# Patient Record
Sex: FEMALE | URBAN - METROPOLITAN AREA
[De-identification: names, ages, dates, MRNs, and addresses within clinical notes are randomized per-mention and may not be internally consistent; named-entity substitution may affect disease eponyms.]

---

## 2024-09-26 ENCOUNTER — HOSPITAL ENCOUNTER (OUTPATIENT)
Facility: HOSPITAL | Age: 74
End: 2024-09-26
Payer: MEDICAID

## 2024-09-28 ENCOUNTER — HOSPITAL ENCOUNTER (OUTPATIENT)
Dept: RADIOLOGY | Facility: HOSPITAL | Age: 74
Discharge: HOME | End: 2024-09-28
Payer: MEDICAID

## 2024-09-28 LAB
ALBUMIN SERPL BCP-MCNC: 2.3 G/DL (ref 3.4–5)
ALP SERPL-CCNC: 119 U/L (ref 33–136)
ALT SERPL W P-5'-P-CCNC: 108 U/L (ref 7–45)
ANION GAP SERPL CALC-SCNC: 14 MMOL/L (ref 10–20)
AST SERPL W P-5'-P-CCNC: 65 U/L (ref 9–39)
BILIRUB SERPL-MCNC: 0.4 MG/DL (ref 0–1.2)
BUN SERPL-MCNC: 82 MG/DL (ref 6–23)
CALCIUM SERPL-MCNC: 8.5 MG/DL (ref 8.6–10.3)
CHLORIDE SERPL-SCNC: 111 MMOL/L (ref 98–107)
CO2 SERPL-SCNC: 31 MMOL/L (ref 21–32)
CREAT SERPL-MCNC: 1.28 MG/DL (ref 0.5–1.05)
EGFRCR SERPLBLD CKD-EPI 2021: 45 ML/MIN/1.73M*2
ERYTHROCYTE [DISTWIDTH] IN BLOOD BY AUTOMATED COUNT: 18.6 % (ref 11.5–14.5)
GLUCOSE SERPL-MCNC: 232 MG/DL (ref 74–99)
HCT VFR BLD AUTO: 26.1 % (ref 36–46)
HGB BLD-MCNC: 8.1 G/DL (ref 12–16)
MCH RBC QN AUTO: 29.2 PG (ref 26–34)
MCHC RBC AUTO-ENTMCNC: 31 G/DL (ref 32–36)
MCV RBC AUTO: 94 FL (ref 80–100)
NRBC BLD-RTO: 0.4 /100 WBCS (ref 0–0)
PLATELET # BLD AUTO: 432 X10*3/UL (ref 150–450)
POTASSIUM SERPL-SCNC: 3.1 MMOL/L (ref 3.5–5.3)
PROT SERPL-MCNC: 5.8 G/DL (ref 6.4–8.2)
RBC # BLD AUTO: 2.77 X10*6/UL (ref 4–5.2)
SODIUM SERPL-SCNC: 153 MMOL/L (ref 136–145)
WBC # BLD AUTO: 15.5 X10*3/UL (ref 4.4–11.3)

## 2024-09-28 PROCEDURE — 71045 X-RAY EXAM CHEST 1 VIEW: CPT | Performed by: RADIOLOGY

## 2024-09-28 PROCEDURE — 85027 COMPLETE CBC AUTOMATED: CPT | Performed by: INTERNAL MEDICINE

## 2024-09-28 PROCEDURE — 80053 COMPREHEN METABOLIC PANEL: CPT | Performed by: INTERNAL MEDICINE

## 2024-09-28 PROCEDURE — 71045 X-RAY EXAM CHEST 1 VIEW: CPT

## 2024-09-28 PROCEDURE — 36415 COLL VENOUS BLD VENIPUNCTURE: CPT | Performed by: INTERNAL MEDICINE

## 2024-09-29 LAB
ALBUMIN SERPL BCP-MCNC: 2.4 G/DL (ref 3.4–5)
ALP SERPL-CCNC: 109 U/L (ref 33–136)
ALT SERPL W P-5'-P-CCNC: 87 U/L (ref 7–45)
ANION GAP SERPL CALC-SCNC: 15 MMOL/L (ref 10–20)
AST SERPL W P-5'-P-CCNC: 51 U/L (ref 9–39)
BILIRUB SERPL-MCNC: 0.3 MG/DL (ref 0–1.2)
BUN SERPL-MCNC: 86 MG/DL (ref 6–23)
CALCIUM SERPL-MCNC: 8.6 MG/DL (ref 8.6–10.3)
CHLORIDE SERPL-SCNC: 110 MMOL/L (ref 98–107)
CHLORIDE UR-SCNC: 106 MMOL/L
CHLORIDE/CREATININE (MMOL/G) IN URINE: 835 MMOL/G CREAT (ref 38–318)
CHOLEST SERPL-MCNC: 145 MG/DL (ref 0–199)
CHOLESTEROL/HDL RATIO: 4.1
CO2 SERPL-SCNC: 31 MMOL/L (ref 21–32)
CREAT SERPL-MCNC: 1.21 MG/DL (ref 0.5–1.05)
CREAT UR-MCNC: 12.7 MG/DL (ref 20–320)
EGFRCR SERPLBLD CKD-EPI 2021: 48 ML/MIN/1.73M*2
ERYTHROCYTE [DISTWIDTH] IN BLOOD BY AUTOMATED COUNT: 18.7 % (ref 11.5–14.5)
GLUCOSE SERPL-MCNC: 147 MG/DL (ref 74–99)
HCT VFR BLD AUTO: 27.4 % (ref 36–46)
HDLC SERPL-MCNC: 35.3 MG/DL
HGB BLD-MCNC: 8 G/DL (ref 12–16)
IRON SATN MFR SERPL: 21 % (ref 25–45)
IRON SERPL-MCNC: 41 UG/DL (ref 35–150)
LDLC SERPL CALC-MCNC: 70 MG/DL
MCH RBC QN AUTO: 28 PG (ref 26–34)
MCHC RBC AUTO-ENTMCNC: 29.2 G/DL (ref 32–36)
MCV RBC AUTO: 96 FL (ref 80–100)
MICROALBUMIN UR-MCNC: 7.5 MG/L
MICROALBUMIN/CREAT UR: 59.1 UG/MG CREAT
MYOGLOBIN SERPL-MCNC: 88 UG/L
NON HDL CHOLESTEROL: 110 MG/DL (ref 0–149)
NRBC BLD-RTO: 0.6 /100 WBCS (ref 0–0)
PLATELET # BLD AUTO: 468 X10*3/UL (ref 150–450)
POTASSIUM SERPL-SCNC: 2.6 MMOL/L (ref 3.5–5.3)
POTASSIUM UR-SCNC: 22 MMOL/L
POTASSIUM/CREAT UR-RTO: 173 MMOL/G CREAT
PROT SERPL-MCNC: 6.3 G/DL (ref 6.4–8.2)
RBC # BLD AUTO: 2.86 X10*6/UL (ref 4–5.2)
SODIUM SERPL-SCNC: 153 MMOL/L (ref 136–145)
SODIUM UR-SCNC: 98 MMOL/L
SODIUM/CREAT UR-RTO: 772 MMOL/G CREAT
TIBC SERPL-MCNC: 195 UG/DL (ref 240–445)
TRIGL SERPL-MCNC: 200 MG/DL (ref 0–149)
UIBC SERPL-MCNC: 154 UG/DL (ref 110–370)
URATE SERPL-MCNC: 12.4 MG/DL (ref 2.3–6.7)
UREA/CREAT UR-SRTO: 27.1 G/G CREAT
UUN UR-MCNC: 344 MG/DL
VLDL: 40 MG/DL (ref 0–40)
WBC # BLD AUTO: 17.2 X10*3/UL (ref 4.4–11.3)

## 2024-09-29 PROCEDURE — 80053 COMPREHEN METABOLIC PANEL: CPT | Performed by: INTERNAL MEDICINE

## 2024-09-29 PROCEDURE — 80061 LIPID PANEL: CPT | Performed by: INTERNAL MEDICINE

## 2024-09-29 PROCEDURE — 84550 ASSAY OF BLOOD/URIC ACID: CPT | Performed by: INTERNAL MEDICINE

## 2024-09-29 PROCEDURE — 85027 COMPLETE CBC AUTOMATED: CPT | Performed by: INTERNAL MEDICINE

## 2024-09-29 PROCEDURE — 83550 IRON BINDING TEST: CPT | Performed by: INTERNAL MEDICINE

## 2024-09-29 PROCEDURE — 36415 COLL VENOUS BLD VENIPUNCTURE: CPT | Performed by: INTERNAL MEDICINE

## 2024-09-29 PROCEDURE — 83874 ASSAY OF MYOGLOBIN: CPT | Mod: PARLAB | Performed by: INTERNAL MEDICINE

## 2024-09-29 PROCEDURE — 83540 ASSAY OF IRON: CPT | Performed by: INTERNAL MEDICINE

## 2024-09-29 PROCEDURE — 82043 UR ALBUMIN QUANTITATIVE: CPT | Performed by: INTERNAL MEDICINE

## 2024-09-29 PROCEDURE — 82436 ASSAY OF URINE CHLORIDE: CPT | Performed by: INTERNAL MEDICINE

## 2024-09-29 PROCEDURE — 84540 ASSAY OF URINE/UREA-N: CPT | Performed by: INTERNAL MEDICINE

## 2024-09-29 PROCEDURE — 87040 BLOOD CULTURE FOR BACTERIA: CPT | Mod: PARLAB | Performed by: INTERNAL MEDICINE

## 2024-09-29 NOTE — CONSULTS
Reason For Consult  NATHALIE/Electrolyte Imbalance    History Of Present Illness  73 year old Female Admitted to the Hospital originally with Acute Cholecystitis..  Had Laparotomy with Subtotal fenestrated Cholecystectomy.  Developed Mental status Changes;; Increase drainage From Ostomy Bag : Tachycardia and Leukocytosis; Had Return to OR where a Large Hematoma was  evacuated; She required Intubation And Mechanical Ventilation.  Developed NATHALIE requiring temporary dialysis ;  Renal Chemistries improved with Dialysis Discontinuation and removal Of Tunneled dialysis catheter.  Patient Developed Positive Cultures for Candida Albicans and Klebsiella in the Sputum For which She Received IV antibiotics.  Patient also developed Hemorrhagic Shock with Hemolytic Anemia with +CATHLEEN;; LDH elevation;Decrease Haptoglobin  Was Given Solu Medrol 1 mg/kg  for AHA(Autoimmune Hemolyic Anemia.  Patient also has Hx Of Chronic Hepatitis C      Renal Service is consulted for evaluation of NATHALIE/Electrolyte Disturbances  Past Medical History  NATHALIE  AHA(Autoimmune Hemolytic Anemia)  Malnutrition  Sepsis  Hemorrhagic Shock  Diabetes 2  Chronic Hepatitis C  Acute Cholecystitis  Anemia  Respiratory failure  Hypokalemia  Hypernatremia  Hypertension  Septic shock    Surgical History  Cholecystectomy  Hematoma Drainage     Social History  She has no history on file for tobacco use, alcohol use, and drug use.    Family History  No family history on file.     Allergies  Patient has no allergy information on record.    Review of Systems  10 point review of system negative except as mentioned in HPI     Physical Exam  General Appearance; obtunded  HEENT; pupils react to light accommodation  Neck; no JVD no bruit no thyromegaly  Lungs; bibasilar crackles on inspiration  Heart; no gallop no rubs  Abdomen; active peristalsis  ; no CVA tenderness  Extremities; 2-3+ edema  Neurological; no clonus or asterixis  Musculoskeletal; decreased muscle tone        Relevant  Results  Results for orders placed or performed during the hospital encounter of 09/26/24 (from the past 24 hour(s))   CBC   Result Value Ref Range    WBC 17.2 (H) 4.4 - 11.3 x10*3/uL    nRBC 0.6 (H) 0.0 - 0.0 /100 WBCs    RBC 2.86 (L) 4.00 - 5.20 x10*6/uL    Hemoglobin 8.0 (L) 12.0 - 16.0 g/dL    Hematocrit 27.4 (L) 36.0 - 46.0 %    MCV 96 80 - 100 fL    MCH 28.0 26.0 - 34.0 pg    MCHC 29.2 (L) 32.0 - 36.0 g/dL    RDW 18.7 (H) 11.5 - 14.5 %    Platelets 468 (H) 150 - 450 x10*3/uL   Comprehensive metabolic panel   Result Value Ref Range    Glucose 147 (H) 74 - 99 mg/dL    Sodium 153 (H) 136 - 145 mmol/L    Potassium 2.6 (LL) 3.5 - 5.3 mmol/L    Chloride 110 (H) 98 - 107 mmol/L    Bicarbonate 31 21 - 32 mmol/L    Anion Gap 15 10 - 20 mmol/L    Urea Nitrogen 86 (H) 6 - 23 mg/dL    Creatinine 1.21 (H) 0.50 - 1.05 mg/dL    eGFR 48 (L) >60 mL/min/1.73m*2    Calcium 8.6 8.6 - 10.3 mg/dL    Albumin 2.4 (L) 3.4 - 5.0 g/dL    Alkaline Phosphatase 109 33 - 136 U/L    Total Protein 6.3 (L) 6.4 - 8.2 g/dL    AST 51 (H) 9 - 39 U/L    Bilirubin, Total 0.3 0.0 - 1.2 mg/dL    ALT 87 (H) 7 - 45 U/L   Iron and TIBC   Result Value Ref Range    Iron 41 35 - 150 ug/dL    UIBC 154 110 - 370 ug/dL    TIBC 195 (L) 240 - 445 ug/dL    % Saturation 21 (L) 25 - 45 %   Lipid panel   Result Value Ref Range    Cholesterol 145 0 - 199 mg/dL    HDL-Cholesterol 35.3 mg/dL    Cholesterol/HDL Ratio 4.1     LDL Calculated 70 <=99 mg/dL    VLDL 40 0 - 40 mg/dL    Triglycerides 200 (H) 0 - 149 mg/dL    Non HDL Cholesterol 110 0 - 149 mg/dL   Uric Acid   Result Value Ref Range    Uric Acid 12.4 (H) 2.3 - 6.7 mg/dL   Urea Nitrogen, Urine Random   Result Value Ref Range    Urea Nitrogen, Urine Random 344 mg/dL    Creatinine, Urine Random 12.7 (L) 20.0 - 320.0 mg/dL    Urea Nitrogen/Creatinine Ratio 27.1 Not established. g/g creat   Albumin-Creatinine Ratio, Urine Random   Result Value Ref Range    Albumin, Urine Random 7.5 Not established mg/L     Creatinine, Urine Random 12.7 (L) 20.0 - 320.0 mg/dL    Albumin/Creatinine Ratio 59.1 ug/mg Creat   Urine electrolytes   Result Value Ref Range    Sodium, Urine Random 98 mmol/L    Sodium/Creatinine Ratio 772 Not established. mmol/g Creat    Potassium, Urine Random 22 mmol/L    Potassium/Creatinine Ratio 173 Not established mmol/g Creat    Chloride, Urine Random 106 mmol/L    Chloride/Creatinine Ratio 835 (H) 38 - 318 mmol/g creat    Creatinine, Urine Random 12.7 (L) 20.0 - 320.0 mg/dL    XR chest 1 view    Result Date: 9/28/2024  Interpreted By:  Isai Reilly, STUDY: XR CHEST 1 VIEW;  9/28/2024 12:52 pm   INDICATION: Signs/Symptoms:pulmonary edema.     COMPARISON: None.   ACCESSION NUMBER(S): [id number]   ORDERING CLINICIAN: RAIMUNDO TARIQ   FINDINGS: Portable AP upright chest x-ray 09/28/2024:   Feeding type NG tube entering the abdomen, the tip of which is not included.   CARDIOMEDIASTINAL SILHOUETTE: Cardiomediastinal silhouette is normal in size and configuration.   LUNGS: Appear clear aside from bilateral mid to lower linear and bandlike densities suggesting subsegmental atelectasis or scars, which can not be distinguished without old or follow-up films. Mild bilateral lateral costophrenic angle blunting could be related to mild-to-moderate pulmonary underinflation, but tiny pleural effusions can not be ruled out without lateral or decubitus views.   ABDOMEN: No remarkable upper abdominal findings.   BONES: No acute osseous changes.       1. Underinflated lungs with subsegmental atelectasis or scarring.   MACRO: None   Signed by: Isai Reilly 9/28/2024 1:46 PM Dictation workstation:   [id number]       Assessment/Plan   Acute kidney injury  Hyponatremia  Hypokalemia  Autoimmune hemolytic anemia  Agree with iron deficiency  Hyperchloremia  Metabolic alkalosis  Anasarca  Sarcopenia  Chronic hepatitis C  Plan  Urinary indices  Iron and nutritional indicis  Replace potassium    I spent  60  minutes in the professional and overall care of this patient.      Ha Balderas MD

## 2024-09-30 LAB
ALBUMIN SERPL BCP-MCNC: 2.6 G/DL (ref 3.4–5)
ANION GAP SERPL CALC-SCNC: 15 MMOL/L (ref 10–20)
BASOPHILS # BLD AUTO: 0.06 X10*3/UL (ref 0–0.1)
BASOPHILS NFR BLD AUTO: 0.3 %
BUN SERPL-MCNC: 78 MG/DL (ref 6–23)
CALCIUM SERPL-MCNC: 9.1 MG/DL (ref 8.6–10.3)
CHLORIDE SERPL-SCNC: 111 MMOL/L (ref 98–107)
CO2 SERPL-SCNC: 31 MMOL/L (ref 21–32)
CREAT SERPL-MCNC: 1.08 MG/DL (ref 0.5–1.05)
EGFRCR SERPLBLD CKD-EPI 2021: 55 ML/MIN/1.73M*2
EOSINOPHIL # BLD AUTO: 0.41 X10*3/UL (ref 0–0.4)
EOSINOPHIL NFR BLD AUTO: 2.2 %
ERYTHROCYTE [DISTWIDTH] IN BLOOD BY AUTOMATED COUNT: 19.1 % (ref 11.5–14.5)
GLUCOSE SERPL-MCNC: 173 MG/DL (ref 74–99)
HCT VFR BLD AUTO: 28 % (ref 36–46)
HGB BLD-MCNC: 8.3 G/DL (ref 12–16)
IMM GRANULOCYTES # BLD AUTO: 0.15 X10*3/UL (ref 0–0.5)
IMM GRANULOCYTES NFR BLD AUTO: 0.8 % (ref 0–0.9)
LYMPHOCYTES # BLD AUTO: 2.48 X10*3/UL (ref 0.8–3)
LYMPHOCYTES NFR BLD AUTO: 13.2 %
MAGNESIUM SERPL-MCNC: 1.56 MG/DL (ref 1.6–2.4)
MCH RBC QN AUTO: 28.4 PG (ref 26–34)
MCHC RBC AUTO-ENTMCNC: 29.6 G/DL (ref 32–36)
MCV RBC AUTO: 96 FL (ref 80–100)
MONOCYTES # BLD AUTO: 0.65 X10*3/UL (ref 0.05–0.8)
MONOCYTES NFR BLD AUTO: 3.5 %
NEUTROPHILS # BLD AUTO: 15.01 X10*3/UL (ref 1.6–5.5)
NEUTROPHILS NFR BLD AUTO: 80 %
NRBC BLD-RTO: 0.4 /100 WBCS (ref 0–0)
PHOSPHATE SERPL-MCNC: 3.2 MG/DL (ref 2.5–4.9)
PLATELET # BLD AUTO: 496 X10*3/UL (ref 150–450)
POTASSIUM SERPL-SCNC: 2.8 MMOL/L (ref 3.5–5.3)
RBC # BLD AUTO: 2.92 X10*6/UL (ref 4–5.2)
SODIUM SERPL-SCNC: 154 MMOL/L (ref 136–145)
WBC # BLD AUTO: 18.8 X10*3/UL (ref 4.4–11.3)

## 2024-09-30 PROCEDURE — 83735 ASSAY OF MAGNESIUM: CPT | Performed by: INTERNAL MEDICINE

## 2024-09-30 PROCEDURE — 80069 RENAL FUNCTION PANEL: CPT | Performed by: INTERNAL MEDICINE

## 2024-09-30 PROCEDURE — 36415 COLL VENOUS BLD VENIPUNCTURE: CPT | Performed by: INTERNAL MEDICINE

## 2024-09-30 PROCEDURE — 85025 COMPLETE CBC W/AUTO DIFF WBC: CPT | Performed by: INTERNAL MEDICINE

## 2024-09-30 NOTE — PROGRESS NOTES
Subjective   Patient had labs drawn disclose transaminitis elevation of the AST and ALT 51  And 87 respectively   Iron saturations are low at 21%  Serum iron level is stable at 41  TIBC is 195  Cholesterol is 145  Uric acid is elevated at 12.4  Myoglobin is normal  Urine electrolytes as follows  Urine albumin creatinine ratio mildly elevated at 59.1  Urinary sodium is 98  Urinary potassium is 22   urinary chloride is 106  Urine urea is 344  Urine Creatinine is 12.7  Fractional secretion of sodium is 6.1%  Fractional secretion of urea is 38.  11%  Today labs disclose a serum potassium of 2.8, serum sodium 154 BUN/creatinine 78 over 1.08%    Objective     Physical Exam    General Appearance; obtunded  HEENT; pupils react to light accommodation  Neck; no JVD no bruit no thyromegaly  Lungs; bibasilar crackles on inspiration  Heart; no gallop no rubs  Abdomen; active peristalsis  ; no CVA tenderness  Extremities; 2-3+ edema  Neurological; no clonus or asterixis  Musculoskeletal; decreased muscle tone       Relevant Results       Results for orders placed or performed during the hospital encounter of 09/26/24 (from the past 24 hour(s))   Renal function panel   Result Value Ref Range    Glucose 173 (H) 74 - 99 mg/dL    Sodium 154 (H) 136 - 145 mmol/L    Potassium 2.8 (LL) 3.5 - 5.3 mmol/L    Chloride 111 (H) 98 - 107 mmol/L    Bicarbonate 31 21 - 32 mmol/L    Anion Gap 15 10 - 20 mmol/L    Urea Nitrogen 78 (H) 6 - 23 mg/dL    Creatinine 1.08 (H) 0.50 - 1.05 mg/dL    eGFR 55 (L) >60 mL/min/1.73m*2    Calcium 9.1 8.6 - 10.3 mg/dL    Phosphorus 3.2 2.5 - 4.9 mg/dL    Albumin 2.6 (L) 3.4 - 5.0 g/dL   Magnesium   Result Value Ref Range    Magnesium 1.56 (L) 1.60 - 2.40 mg/dL   CBC and Auto Differential   Result Value Ref Range    WBC 18.8 (H) 4.4 - 11.3 x10*3/uL    nRBC 0.4 (H) 0.0 - 0.0 /100 WBCs    RBC 2.92 (L) 4.00 - 5.20 x10*6/uL    Hemoglobin 8.3 (L) 12.0 - 16.0 g/dL    Hematocrit 28.0 (L) 36.0 - 46.0 %    MCV 96 80 -  100 fL    MCH 28.4 26.0 - 34.0 pg    MCHC 29.6 (L) 32.0 - 36.0 g/dL    RDW 19.1 (H) 11.5 - 14.5 %    Platelets 496 (H) 150 - 450 x10*3/uL    Neutrophils % 80.0 40.0 - 80.0 %    Immature Granulocytes %, Automated 0.8 0.0 - 0.9 %    Lymphocytes % 13.2 13.0 - 44.0 %    Monocytes % 3.5 2.0 - 10.0 %    Eosinophils % 2.2 0.0 - 6.0 %    Basophils % 0.3 0.0 - 2.0 %    Neutrophils Absolute 15.01 (H) 1.60 - 5.50 x10*3/uL    Immature Granulocytes Absolute, Automated 0.15 0.00 - 0.50 x10*3/uL    Lymphocytes Absolute 2.48 0.80 - 3.00 x10*3/uL    Monocytes Absolute 0.65 0.05 - 0.80 x10*3/uL    Eosinophils Absolute 0.41 (H) 0.00 - 0.40 x10*3/uL    Basophils Absolute 0.06 0.00 - 0.10 x10*3/uL       XR chest 1 view    Result Date: 9/28/2024  Interpreted By:  Isai Reilly, STUDY: XR CHEST 1 VIEW;  9/28/2024 12:52 pm   INDICATION: Signs/Symptoms:pulmonary edema.     COMPARISON: None.   ACCESSION NUMBER(S): [id number]   ORDERING CLINICIAN: RAIMUNDO TARIQ   FINDINGS: Portable AP upright chest x-ray 09/28/2024:   Feeding type NG tube entering the abdomen, the tip of which is not included.   CARDIOMEDIASTINAL SILHOUETTE: Cardiomediastinal silhouette is normal in size and configuration.   LUNGS: Appear clear aside from bilateral mid to lower linear and bandlike densities suggesting subsegmental atelectasis or scars, which can not be distinguished without old or follow-up films. Mild bilateral lateral costophrenic angle blunting could be related to mild-to-moderate pulmonary underinflation, but tiny pleural effusions can not be ruled out without lateral or decubitus views.   ABDOMEN: No remarkable upper abdominal findings.   BONES: No acute osseous changes.       1. Underinflated lungs with subsegmental atelectasis or scarring.   MACRO: None   Signed by: Isai Reilly 9/28/2024 1:46 PM Dictation workstation:   [id number]        Assessment/Plan   Acute kidney  injury  Hypernatremia  Hypokalemia  Hyperuricemia  Proteinuria  Transaminitis  Autoimmune hemolytic anemia  Protein calorie malnutrition  Anemia iron deficiency and chronic illness  Plan; correct electrolyte imbalance  D5W for water deficit correction  Monitor renal chemistries  Avoid nephrotoxins or hepatotoxins  Assessment & Plan           I spent  20 minutes in the professional and overall care of this patient.      Ha Balderas MD

## 2024-10-01 LAB
ALBUMIN SERPL BCP-MCNC: 2.5 G/DL (ref 3.4–5)
ALP SERPL-CCNC: 95 U/L (ref 33–136)
ALT SERPL W P-5'-P-CCNC: 58 U/L (ref 7–45)
ANION GAP SERPL CALC-SCNC: 16 MMOL/L (ref 10–20)
AST SERPL W P-5'-P-CCNC: 38 U/L (ref 9–39)
BASOPHILS # BLD AUTO: 0.03 X10*3/UL (ref 0–0.1)
BASOPHILS NFR BLD AUTO: 0.2 %
BILIRUB SERPL-MCNC: 0.4 MG/DL (ref 0–1.2)
BUN SERPL-MCNC: 74 MG/DL (ref 6–23)
CALCIUM SERPL-MCNC: 9.1 MG/DL (ref 8.6–10.3)
CHLORIDE SERPL-SCNC: 114 MMOL/L (ref 98–107)
CO2 SERPL-SCNC: 29 MMOL/L (ref 21–32)
CREAT SERPL-MCNC: 1.06 MG/DL (ref 0.5–1.05)
EGFRCR SERPLBLD CKD-EPI 2021: 56 ML/MIN/1.73M*2
EOSINOPHIL # BLD AUTO: 0.35 X10*3/UL (ref 0–0.4)
EOSINOPHIL NFR BLD AUTO: 2.2 %
ERYTHROCYTE [DISTWIDTH] IN BLOOD BY AUTOMATED COUNT: 19.2 % (ref 11.5–14.5)
GLUCOSE SERPL-MCNC: 190 MG/DL (ref 74–99)
HCT VFR BLD AUTO: 26.9 % (ref 36–46)
HGB BLD-MCNC: 7.8 G/DL (ref 12–16)
IMM GRANULOCYTES # BLD AUTO: 0.1 X10*3/UL (ref 0–0.5)
IMM GRANULOCYTES NFR BLD AUTO: 0.6 % (ref 0–0.9)
LYMPHOCYTES # BLD AUTO: 2.4 X10*3/UL (ref 0.8–3)
LYMPHOCYTES NFR BLD AUTO: 14.9 %
MAGNESIUM SERPL-MCNC: 2.1 MG/DL (ref 1.6–2.4)
MCH RBC QN AUTO: 28.3 PG (ref 26–34)
MCHC RBC AUTO-ENTMCNC: 29 G/DL (ref 32–36)
MCV RBC AUTO: 98 FL (ref 80–100)
MONOCYTES # BLD AUTO: 0.72 X10*3/UL (ref 0.05–0.8)
MONOCYTES NFR BLD AUTO: 4.5 %
NEUTROPHILS # BLD AUTO: 12.5 X10*3/UL (ref 1.6–5.5)
NEUTROPHILS NFR BLD AUTO: 77.6 %
NRBC BLD-RTO: 0.5 /100 WBCS (ref 0–0)
PLATELET # BLD AUTO: 450 X10*3/UL (ref 150–450)
POTASSIUM SERPL-SCNC: 3 MMOL/L (ref 3.5–5.3)
PROT SERPL-MCNC: 6.5 G/DL (ref 6.4–8.2)
RBC # BLD AUTO: 2.76 X10*6/UL (ref 4–5.2)
SODIUM SERPL-SCNC: 156 MMOL/L (ref 136–145)
WBC # BLD AUTO: 16.1 X10*3/UL (ref 4.4–11.3)

## 2024-10-01 PROCEDURE — 83735 ASSAY OF MAGNESIUM: CPT | Performed by: INTERNAL MEDICINE

## 2024-10-01 PROCEDURE — 84075 ASSAY ALKALINE PHOSPHATASE: CPT | Performed by: INTERNAL MEDICINE

## 2024-10-01 PROCEDURE — 85025 COMPLETE CBC W/AUTO DIFF WBC: CPT | Performed by: INTERNAL MEDICINE

## 2024-10-01 PROCEDURE — 36415 COLL VENOUS BLD VENIPUNCTURE: CPT | Performed by: INTERNAL MEDICINE

## 2024-10-01 NOTE — PROGRESS NOTES
Subjective   No new complaint  Labs drawn today disclose again hypernatremia 156  Potassium still low at 3  Hyperchloremia at 114  Creatinine's are stable BUN is 74 creatinine is 1.06  Hemoglobin is 7.8  WBC count is 16,100          Objective     Physical Exam    General Appearance; obtunded  HEENT; pupils react to light accommodation  Neck; no JVD no bruit no thyromegaly  Lungs; bibasilar crackles on inspiration  Heart; no gallop no rubs  Abdomen; active peristalsis  ; no CVA tenderness  Extremities; 2-3+ edema  Neurological; no clonus or asterixis  Musculoskeletal; decreased muscle tone       Relevant Results       Results for orders placed or performed during the hospital encounter of 09/26/24 (from the past 24 hour(s))   CBC and Auto Differential   Result Value Ref Range    WBC 16.1 (H) 4.4 - 11.3 x10*3/uL    nRBC 0.5 (H) 0.0 - 0.0 /100 WBCs    RBC 2.76 (L) 4.00 - 5.20 x10*6/uL    Hemoglobin 7.8 (L) 12.0 - 16.0 g/dL    Hematocrit 26.9 (L) 36.0 - 46.0 %    MCV 98 80 - 100 fL    MCH 28.3 26.0 - 34.0 pg    MCHC 29.0 (L) 32.0 - 36.0 g/dL    RDW 19.2 (H) 11.5 - 14.5 %    Platelets 450 150 - 450 x10*3/uL    Neutrophils % 77.6 40.0 - 80.0 %    Immature Granulocytes %, Automated 0.6 0.0 - 0.9 %    Lymphocytes % 14.9 13.0 - 44.0 %    Monocytes % 4.5 2.0 - 10.0 %    Eosinophils % 2.2 0.0 - 6.0 %    Basophils % 0.2 0.0 - 2.0 %    Neutrophils Absolute 12.50 (H) 1.60 - 5.50 x10*3/uL    Immature Granulocytes Absolute, Automated 0.10 0.00 - 0.50 x10*3/uL    Lymphocytes Absolute 2.40 0.80 - 3.00 x10*3/uL    Monocytes Absolute 0.72 0.05 - 0.80 x10*3/uL    Eosinophils Absolute 0.35 0.00 - 0.40 x10*3/uL    Basophils Absolute 0.03 0.00 - 0.10 x10*3/uL   Comprehensive metabolic panel   Result Value Ref Range    Glucose 190 (H) 74 - 99 mg/dL    Sodium 156 (H) 136 - 145 mmol/L    Potassium 3.0 (L) 3.5 - 5.3 mmol/L    Chloride 114 (H) 98 - 107 mmol/L    Bicarbonate 29 21 - 32 mmol/L    Anion Gap 16 10 - 20 mmol/L    Urea Nitrogen  74 (H) 6 - 23 mg/dL    Creatinine 1.06 (H) 0.50 - 1.05 mg/dL    eGFR 56 (L) >60 mL/min/1.73m*2    Calcium 9.1 8.6 - 10.3 mg/dL    Albumin 2.5 (L) 3.4 - 5.0 g/dL    Alkaline Phosphatase 95 33 - 136 U/L    Total Protein 6.5 6.4 - 8.2 g/dL    AST 38 9 - 39 U/L    Bilirubin, Total 0.4 0.0 - 1.2 mg/dL    ALT 58 (H) 7 - 45 U/L   Magnesium   Result Value Ref Range    Magnesium 2.10 1.60 - 2.40 mg/dL       XR chest 1 view    Result Date: 9/28/2024  Interpreted By:  Isai Reilly, STUDY: XR CHEST 1 VIEW;  9/28/2024 12:52 pm   INDICATION: Signs/Symptoms:pulmonary edema.     COMPARISON: None.   ACCESSION NUMBER(S): [id number]   ORDERING CLINICIAN: RAIMUNDO TARIQ   FINDINGS: Portable AP upright chest x-ray 09/28/2024:   Feeding type NG tube entering the abdomen, the tip of which is not included.   CARDIOMEDIASTINAL SILHOUETTE: Cardiomediastinal silhouette is normal in size and configuration.   LUNGS: Appear clear aside from bilateral mid to lower linear and bandlike densities suggesting subsegmental atelectasis or scars, which can not be distinguished without old or follow-up films. Mild bilateral lateral costophrenic angle blunting could be related to mild-to-moderate pulmonary underinflation, but tiny pleural effusions can not be ruled out without lateral or decubitus views.   ABDOMEN: No remarkable upper abdominal findings.   BONES: No acute osseous changes.       1. Underinflated lungs with subsegmental atelectasis or scarring.   MACRO: None   Signed by: Isai Reilly 9/28/2024 1:46 PM Dictation workstation:   [id number]        Assessment/Plan   Acute kidney injury  Hypernatremia  Hypokalemia  Hyperuricemia  Proteinuria  Transaminitis  Autoimmune hemolytic anemia  Protein calorie malnutrition  Anemia iron deficiency and chronic illness  Plan; replace potassium  Replace water deficit  Assessment & Plan           I spent  20 minutes in the professional and overall care of this patient.      Raimundo MERRITT  MD Andrey

## 2024-10-02 ENCOUNTER — HOSPITAL ENCOUNTER (OUTPATIENT)
Dept: RADIOLOGY | Facility: HOSPITAL | Age: 74
Discharge: HOME | End: 2024-10-02
Payer: MEDICAID

## 2024-10-02 LAB
ANION GAP SERPL CALC-SCNC: 14 MMOL/L (ref 10–20)
APPEARANCE UR: ABNORMAL
BACTERIA #/AREA URNS AUTO: ABNORMAL /HPF
BASOPHILS # BLD AUTO: 0.03 X10*3/UL (ref 0–0.1)
BASOPHILS NFR BLD AUTO: 0.2 %
BILIRUB UR STRIP.AUTO-MCNC: NEGATIVE MG/DL
BUN SERPL-MCNC: 66 MG/DL (ref 6–23)
CALCIUM SERPL-MCNC: 8.5 MG/DL (ref 8.6–10.3)
CHLORIDE SERPL-SCNC: 109 MMOL/L (ref 98–107)
CO2 SERPL-SCNC: 29 MMOL/L (ref 21–32)
COLOR UR: YELLOW
CREAT SERPL-MCNC: 0.96 MG/DL (ref 0.5–1.05)
EGFRCR SERPLBLD CKD-EPI 2021: 63 ML/MIN/1.73M*2
EOSINOPHIL # BLD AUTO: 0.37 X10*3/UL (ref 0–0.4)
EOSINOPHIL NFR BLD AUTO: 2.4 %
ERYTHROCYTE [DISTWIDTH] IN BLOOD BY AUTOMATED COUNT: 19 % (ref 11.5–14.5)
GLUCOSE SERPL-MCNC: 193 MG/DL (ref 74–99)
GLUCOSE UR STRIP.AUTO-MCNC: NEGATIVE MG/DL
HCT VFR BLD AUTO: 25.6 % (ref 36–46)
HGB BLD-MCNC: 7.6 G/DL (ref 12–16)
HOLD SPECIMEN: NORMAL
IMM GRANULOCYTES # BLD AUTO: 0.18 X10*3/UL (ref 0–0.5)
IMM GRANULOCYTES NFR BLD AUTO: 1.2 % (ref 0–0.9)
KETONES UR STRIP.AUTO-MCNC: NEGATIVE MG/DL
LEUKOCYTE ESTERASE UR QL STRIP.AUTO: ABNORMAL
LYMPHOCYTES # BLD AUTO: 2.29 X10*3/UL (ref 0.8–3)
LYMPHOCYTES NFR BLD AUTO: 14.8 %
MAGNESIUM SERPL-MCNC: 1.63 MG/DL (ref 1.6–2.4)
MCH RBC QN AUTO: 28.8 PG (ref 26–34)
MCHC RBC AUTO-ENTMCNC: 29.7 G/DL (ref 32–36)
MCV RBC AUTO: 97 FL (ref 80–100)
MONOCYTES # BLD AUTO: 0.67 X10*3/UL (ref 0.05–0.8)
MONOCYTES NFR BLD AUTO: 4.3 %
NEUTROPHILS # BLD AUTO: 11.89 X10*3/UL (ref 1.6–5.5)
NEUTROPHILS NFR BLD AUTO: 77.1 %
NITRITE UR QL STRIP.AUTO: NEGATIVE
NRBC BLD-RTO: 0.7 /100 WBCS (ref 0–0)
PH UR STRIP.AUTO: 5.5 [PH]
PLATELET # BLD AUTO: 439 X10*3/UL (ref 150–450)
POTASSIUM SERPL-SCNC: 2.7 MMOL/L (ref 3.5–5.3)
PROT UR STRIP.AUTO-MCNC: ABNORMAL MG/DL
RBC # BLD AUTO: 2.64 X10*6/UL (ref 4–5.2)
RBC # UR STRIP.AUTO: ABNORMAL /UL
RBC #/AREA URNS AUTO: ABNORMAL /HPF
SODIUM SERPL-SCNC: 149 MMOL/L (ref 136–145)
SP GR UR STRIP.AUTO: 1.01
UROBILINOGEN UR STRIP.AUTO-MCNC: ABNORMAL MG/DL
WBC # BLD AUTO: 15.4 X10*3/UL (ref 4.4–11.3)
WBC #/AREA URNS AUTO: >50 /HPF
WBC CLUMPS #/AREA URNS AUTO: ABNORMAL /HPF
YEAST BUDDING #/AREA UR COMP ASSIST: PRESENT /HPF
YEAST HYPHAE #/AREA UR COMP ASSIST: PRESENT /HPF

## 2024-10-02 PROCEDURE — 87086 URINE CULTURE/COLONY COUNT: CPT | Mod: PARLAB | Performed by: INTERNAL MEDICINE

## 2024-10-02 PROCEDURE — 81001 URINALYSIS AUTO W/SCOPE: CPT | Performed by: INTERNAL MEDICINE

## 2024-10-02 PROCEDURE — 36415 COLL VENOUS BLD VENIPUNCTURE: CPT | Performed by: INTERNAL MEDICINE

## 2024-10-02 PROCEDURE — 87077 CULTURE AEROBIC IDENTIFY: CPT | Mod: PARLAB | Performed by: INTERNAL MEDICINE

## 2024-10-02 PROCEDURE — 83735 ASSAY OF MAGNESIUM: CPT | Performed by: INTERNAL MEDICINE

## 2024-10-02 PROCEDURE — 80048 BASIC METABOLIC PNL TOTAL CA: CPT | Performed by: INTERNAL MEDICINE

## 2024-10-02 PROCEDURE — 87186 SC STD MICRODIL/AGAR DIL: CPT | Mod: PARLAB | Performed by: INTERNAL MEDICINE

## 2024-10-02 PROCEDURE — 74018 RADEX ABDOMEN 1 VIEW: CPT

## 2024-10-02 PROCEDURE — 85025 COMPLETE CBC W/AUTO DIFF WBC: CPT | Performed by: INTERNAL MEDICINE

## 2024-10-02 PROCEDURE — 74018 RADEX ABDOMEN 1 VIEW: CPT | Performed by: RADIOLOGY

## 2024-10-02 NOTE — PROGRESS NOTES
Subjective     Patient have hypomagnesemia and hypokalemia close electrolytes been replaced.  Hemoglobin is low at 7.6 currently on IV iron supplementation urinalysis uses close pyuria with more than 50 WBCs per high-power field  RBCs 6-10.  And she still have many yeast and hyphae and 4+ bacteria and urinary sediment  Infectious diseases on board        Objective     Physical Exam    General Appearance; obtunded  HEENT; pupils react to light accommodation  Neck; no JVD no bruit no thyromegaly  Lungs; bibasilar crackles on inspiration  Heart; no gallop no rubs  Abdomen; active peristalsis  ; no CVA tenderness  Extremities; 2-3+ edema  Neurological; no clonus or asterixis  Musculoskeletal; decreased muscle tone       Assessment/Plan   Acute kidney injury  Funguria  Hypomagnesemia  UTI  Hypokalemia  Hyperuricemia  Proteinuria  Transaminitis  Autoimmune hemolytic anemia  Protein calorie malnutrition  Anemia iron deficiency and chronic illness  Plan; replace potassium/Magnesium  Monitor renal chemistries    Assessment & Plan           I spent  20 minutes in the professional and overall care of this patient.      Ha Balderas MD

## 2024-10-03 LAB
ALBUMIN SERPL BCP-MCNC: 2.6 G/DL (ref 3.4–5)
ALP SERPL-CCNC: 92 U/L (ref 33–136)
ALT SERPL W P-5'-P-CCNC: 47 U/L (ref 7–45)
ANION GAP SERPL CALC-SCNC: 14 MMOL/L (ref 10–20)
AST SERPL W P-5'-P-CCNC: 33 U/L (ref 9–39)
BACTERIA BLD CULT: NORMAL
BASOPHILS # BLD AUTO: 0.04 X10*3/UL (ref 0–0.1)
BASOPHILS NFR BLD AUTO: 0.3 %
BILIRUB SERPL-MCNC: 0.4 MG/DL (ref 0–1.2)
BUN SERPL-MCNC: 56 MG/DL (ref 6–23)
CALCIUM SERPL-MCNC: 8.8 MG/DL (ref 8.6–10.3)
CHLORIDE SERPL-SCNC: 103 MMOL/L (ref 98–107)
CO2 SERPL-SCNC: 26 MMOL/L (ref 21–32)
CREAT SERPL-MCNC: 0.94 MG/DL (ref 0.5–1.05)
EGFRCR SERPLBLD CKD-EPI 2021: 65 ML/MIN/1.73M*2
EOSINOPHIL # BLD AUTO: 0.35 X10*3/UL (ref 0–0.4)
EOSINOPHIL NFR BLD AUTO: 2.4 %
ERYTHROCYTE [DISTWIDTH] IN BLOOD BY AUTOMATED COUNT: 18.5 % (ref 11.5–14.5)
GLUCOSE SERPL-MCNC: 232 MG/DL (ref 74–99)
HCT VFR BLD AUTO: 26.2 % (ref 36–46)
HGB BLD-MCNC: 7.9 G/DL (ref 12–16)
IMM GRANULOCYTES # BLD AUTO: 0.28 X10*3/UL (ref 0–0.5)
IMM GRANULOCYTES NFR BLD AUTO: 1.9 % (ref 0–0.9)
LYMPHOCYTES # BLD AUTO: 3.16 X10*3/UL (ref 0.8–3)
LYMPHOCYTES NFR BLD AUTO: 21.3 %
MAGNESIUM SERPL-MCNC: 1.82 MG/DL (ref 1.6–2.4)
MCH RBC QN AUTO: 29.3 PG (ref 26–34)
MCHC RBC AUTO-ENTMCNC: 30.2 G/DL (ref 32–36)
MCV RBC AUTO: 97 FL (ref 80–100)
MONOCYTES # BLD AUTO: 0.72 X10*3/UL (ref 0.05–0.8)
MONOCYTES NFR BLD AUTO: 4.9 %
NEUTROPHILS # BLD AUTO: 10.28 X10*3/UL (ref 1.6–5.5)
NEUTROPHILS NFR BLD AUTO: 69.2 %
NRBC BLD-RTO: 0.8 /100 WBCS (ref 0–0)
PLATELET # BLD AUTO: 403 X10*3/UL (ref 150–450)
POTASSIUM SERPL-SCNC: 3.2 MMOL/L (ref 3.5–5.3)
PROT SERPL-MCNC: 6.5 G/DL (ref 6.4–8.2)
RBC # BLD AUTO: 2.7 X10*6/UL (ref 4–5.2)
SODIUM SERPL-SCNC: 140 MMOL/L (ref 136–145)
WBC # BLD AUTO: 14.8 X10*3/UL (ref 4.4–11.3)

## 2024-10-03 PROCEDURE — 85025 COMPLETE CBC W/AUTO DIFF WBC: CPT | Performed by: INTERNAL MEDICINE

## 2024-10-03 PROCEDURE — 83735 ASSAY OF MAGNESIUM: CPT | Performed by: INTERNAL MEDICINE

## 2024-10-03 PROCEDURE — 80053 COMPREHEN METABOLIC PANEL: CPT | Performed by: INTERNAL MEDICINE

## 2024-10-03 PROCEDURE — 36415 COLL VENOUS BLD VENIPUNCTURE: CPT | Performed by: INTERNAL MEDICINE

## 2024-10-03 NOTE — PROGRESS NOTES
Subjective     Patient had no new complaints continue to have hypokalemia   Potassium supplement dose adjusted        Objective     Results for orders placed or performed during the hospital encounter of 09/26/24 (from the past 24 hour(s))   CBC and Auto Differential   Result Value Ref Range    WBC 14.8 (H) 4.4 - 11.3 x10*3/uL    nRBC 0.8 (H) 0.0 - 0.0 /100 WBCs    RBC 2.70 (L) 4.00 - 5.20 x10*6/uL    Hemoglobin 7.9 (L) 12.0 - 16.0 g/dL    Hematocrit 26.2 (L) 36.0 - 46.0 %    MCV 97 80 - 100 fL    MCH 29.3 26.0 - 34.0 pg    MCHC 30.2 (L) 32.0 - 36.0 g/dL    RDW 18.5 (H) 11.5 - 14.5 %    Platelets 403 150 - 450 x10*3/uL    Neutrophils % 69.2 40.0 - 80.0 %    Immature Granulocytes %, Automated 1.9 (H) 0.0 - 0.9 %    Lymphocytes % 21.3 13.0 - 44.0 %    Monocytes % 4.9 2.0 - 10.0 %    Eosinophils % 2.4 0.0 - 6.0 %    Basophils % 0.3 0.0 - 2.0 %    Neutrophils Absolute 10.28 (H) 1.60 - 5.50 x10*3/uL    Immature Granulocytes Absolute, Automated 0.28 0.00 - 0.50 x10*3/uL    Lymphocytes Absolute 3.16 (H) 0.80 - 3.00 x10*3/uL    Monocytes Absolute 0.72 0.05 - 0.80 x10*3/uL    Eosinophils Absolute 0.35 0.00 - 0.40 x10*3/uL    Basophils Absolute 0.04 0.00 - 0.10 x10*3/uL   Comprehensive metabolic panel   Result Value Ref Range    Glucose 232 (H) 74 - 99 mg/dL    Sodium 140 136 - 145 mmol/L    Potassium 3.2 (L) 3.5 - 5.3 mmol/L    Chloride 103 98 - 107 mmol/L    Bicarbonate 26 21 - 32 mmol/L    Anion Gap 14 10 - 20 mmol/L    Urea Nitrogen 56 (H) 6 - 23 mg/dL    Creatinine 0.94 0.50 - 1.05 mg/dL    eGFR 65 >60 mL/min/1.73m*2    Calcium 8.8 8.6 - 10.3 mg/dL    Albumin 2.6 (L) 3.4 - 5.0 g/dL    Alkaline Phosphatase 92 33 - 136 U/L    Total Protein 6.5 6.4 - 8.2 g/dL    AST 33 9 - 39 U/L    Bilirubin, Total 0.4 0.0 - 1.2 mg/dL    ALT 47 (H) 7 - 45 U/L   Magnesium   Result Value Ref Range    Magnesium 1.82 1.60 - 2.40 mg/dL           Physical Exam    General Appearance; obtunded  HEENT; pupils react to light accommodation  Neck;  no JVD no bruit no thyromegaly  Lungs; bibasilar crackles on inspiration  Heart; no gallop no rubs  Abdomen; active peristalsis  ; no CVA tenderness  Extremities; 2-3+ edema  Neurological; no clonus or asterixis  Musculoskeletal; decreased muscle tone       Assessment/Plan   Acute kidney injury  Funguria  Hypomagnesemia  UTI  Hypokalemia  Hyperuricemia  Proteinuria  Transaminitis  Autoimmune hemolytic anemia  Protein calorie malnutrition  Anemia iron deficiency and chronic illness  Plan; replace potassium/Magnesium  Monitor renal chemistries    Assessment & Plan           I spent  20 minutes in the professional and overall care of this patient.      Ha Balderas MD

## 2024-10-04 NOTE — PROGRESS NOTES
Subjective     No new events or complaints      Objective     No results found for this or any previous visit (from the past 24 hour(s)).          Physical Exam    General Appearance; obtunded  HEENT; pupils react to light accommodation  Neck; no JVD no bruit no thyromegaly  Lungs; bibasilar crackles on inspiration  Heart; no gallop no rubs  Abdomen; active peristalsis  ; no CVA tenderness  Extremities; 2-3+ edema  Neurological; no clonus or asterixis  Musculoskeletal; decreased muscle tone       Assessment/Plan   Acute kidney injury  Funguria  Hypomagnesemia  UTI  Hypokalemia  Hyperuricemia  Proteinuria  Transaminitis  Autoimmune hemolytic anemia  Protein calorie malnutrition  Anemia iron deficiency and chronic illness  Plan; labs in the morning    Assessment & Plan           I spent  20 minutes in the professional and overall care of this patient.      Ha Balderas MD

## 2024-10-05 LAB
BACTERIA UR CULT: ABNORMAL
BACTERIA UR CULT: ABNORMAL

## 2024-10-05 NOTE — PROGRESS NOTES
Subjective     No new complaints      Objective     Labs pending        Physical Exam    General Appearance; obtunded  HEENT; pupils react to light accommodation  Neck; no JVD no bruit no thyromegaly  Lungs; bibasilar crackles on inspiration  Heart; no gallop no rubs  Abdomen; active peristalsis  ; no CVA tenderness  Extremities; 2-3+ edema  Neurological; no clonus or asterixis  Musculoskeletal; decreased muscle tone       Assessment/Plan   Acute kidney injury  Funguria  Hypomagnesemia  UTI  Hypokalemia  Hyperuricemia  Proteinuria  Transaminitis  Autoimmune hemolytic anemia  Protein calorie malnutrition  Anemia iron deficiency and chronic illness  Plan; labs in the morning    Assessment & Plan           I spent  20 minutes in the professional and overall care of this patient.      Ha Balderas MD

## 2024-10-06 ENCOUNTER — HOSPITAL ENCOUNTER (OUTPATIENT)
Dept: RADIOLOGY | Facility: HOSPITAL | Age: 74
Discharge: HOME | End: 2024-10-06
Payer: MEDICAID

## 2024-10-06 LAB
ALBUMIN SERPL BCP-MCNC: 2.6 G/DL (ref 3.4–5)
ALP SERPL-CCNC: 99 U/L (ref 33–136)
ALT SERPL W P-5'-P-CCNC: 37 U/L (ref 7–45)
ANION GAP SERPL CALC-SCNC: 11 MMOL/L (ref 10–20)
AST SERPL W P-5'-P-CCNC: 30 U/L (ref 9–39)
BILIRUB SERPL-MCNC: 0.4 MG/DL (ref 0–1.2)
BUN SERPL-MCNC: 43 MG/DL (ref 6–23)
CALCIUM SERPL-MCNC: 8.5 MG/DL (ref 8.6–10.3)
CHLORIDE SERPL-SCNC: 91 MMOL/L (ref 98–107)
CO2 SERPL-SCNC: 29 MMOL/L (ref 21–32)
CREAT SERPL-MCNC: 0.79 MG/DL (ref 0.5–1.05)
EGFRCR SERPLBLD CKD-EPI 2021: 80 ML/MIN/1.73M*2
ERYTHROCYTE [DISTWIDTH] IN BLOOD BY AUTOMATED COUNT: 17.8 % (ref 11.5–14.5)
GLUCOSE SERPL-MCNC: 160 MG/DL (ref 74–99)
HCT VFR BLD AUTO: 23.6 % (ref 36–46)
HGB BLD-MCNC: 7.6 G/DL (ref 12–16)
MCH RBC QN AUTO: 29.1 PG (ref 26–34)
MCHC RBC AUTO-ENTMCNC: 32.2 G/DL (ref 32–36)
MCV RBC AUTO: 90 FL (ref 80–100)
NRBC BLD-RTO: 1.5 /100 WBCS (ref 0–0)
PLATELET # BLD AUTO: 296 X10*3/UL (ref 150–450)
POTASSIUM SERPL-SCNC: 3.1 MMOL/L (ref 3.5–5.3)
PROT SERPL-MCNC: 6.3 G/DL (ref 6.4–8.2)
RBC # BLD AUTO: 2.61 X10*6/UL (ref 4–5.2)
SODIUM SERPL-SCNC: 128 MMOL/L (ref 136–145)
WBC # BLD AUTO: 14.6 X10*3/UL (ref 4.4–11.3)

## 2024-10-06 PROCEDURE — 74018 RADEX ABDOMEN 1 VIEW: CPT

## 2024-10-06 PROCEDURE — 71045 X-RAY EXAM CHEST 1 VIEW: CPT

## 2024-10-06 PROCEDURE — 85027 COMPLETE CBC AUTOMATED: CPT | Performed by: INTERNAL MEDICINE

## 2024-10-06 PROCEDURE — 71045 X-RAY EXAM CHEST 1 VIEW: CPT | Performed by: RADIOLOGY

## 2024-10-06 PROCEDURE — 80053 COMPREHEN METABOLIC PANEL: CPT | Performed by: INTERNAL MEDICINE

## 2024-10-06 PROCEDURE — 74018 RADEX ABDOMEN 1 VIEW: CPT | Performed by: RADIOLOGY

## 2024-10-06 NOTE — PROGRESS NOTES
Subjective     Patient is alert today.  Her electrolytes are improving serum sodium today is 134, potassium is 4.  Hemoglobin is 7.9  WBC count is 14,500  Objective   Results for orders placed or performed during the hospital encounter of 09/26/24 (from the past 24 hour(s))   Renal function panel   Result Value Ref Range    Glucose 148 (H) 74 - 99 mg/dL    Sodium 134 (L) 136 - 145 mmol/L    Potassium 4.0 3.5 - 5.3 mmol/L    Chloride 95 (L) 98 - 107 mmol/L    Bicarbonate 29 21 - 32 mmol/L    Anion Gap 14 10 - 20 mmol/L    Urea Nitrogen 35 (H) 6 - 23 mg/dL    Creatinine 0.68 0.50 - 1.05 mg/dL    eGFR >90 >60 mL/min/1.73m*2    Calcium 8.6 8.6 - 10.3 mg/dL    Phosphorus 2.9 2.5 - 4.9 mg/dL    Albumin 2.6 (L) 3.4 - 5.0 g/dL   CBC and Auto Differential   Result Value Ref Range    WBC 14.5 (H) 4.4 - 11.3 x10*3/uL    nRBC 2.9 (H) 0.0 - 0.0 /100 WBCs    RBC 2.71 (L) 4.00 - 5.20 x10*6/uL    Hemoglobin 7.9 (L) 12.0 - 16.0 g/dL    Hematocrit 24.8 (L) 36.0 - 46.0 %    MCV 92 80 - 100 fL    MCH 29.2 26.0 - 34.0 pg    MCHC 31.9 (L) 32.0 - 36.0 g/dL    RDW 18.4 (H) 11.5 - 14.5 %    Platelets 342 150 - 450 x10*3/uL    Neutrophils % 71.4 40.0 - 80.0 %    Immature Granulocytes %, Automated 4.4 (H) 0.0 - 0.9 %    Lymphocytes % 16.8 13.0 - 44.0 %    Monocytes % 5.8 2.0 - 10.0 %    Eosinophils % 1.3 0.0 - 6.0 %    Basophils % 0.3 0.0 - 2.0 %    Neutrophils Absolute 10.32 (H) 1.60 - 5.50 x10*3/uL    Immature Granulocytes Absolute, Automated 0.63 (H) 0.00 - 0.50 x10*3/uL    Lymphocytes Absolute 2.43 0.80 - 3.00 x10*3/uL    Monocytes Absolute 0.84 (H) 0.05 - 0.80 x10*3/uL    Eosinophils Absolute 0.19 0.00 - 0.40 x10*3/uL    Basophils Absolute 0.04 0.00 - 0.10 x10*3/uL                   Physical Exam    General Appearance; obtunded  HEENT; pupils react to light accommodation  Neck; no JVD no bruit no thyromegaly  Lungs; bibasilar crackles on inspiration  Heart; no gallop no rubs  Abdomen; active peristalsis  ; no CVA  tenderness  Extremities; 2-3+ edema  Neurological; no clonus or asterixis  Musculoskeletal; decreased muscle tone       Assessment/Plan   Acute kidney injury  Funguria  Hypomagnesemia  UTI  Anemia  Hyperuricemia  Proteinuria  Transaminitis  Autoimmune hemolytic anemia  Protein calorie malnutrition  Anemia iron deficiency and chronic illness  Plan;   Speech therapy for swallowing assessment  Assessment & Plan           I spent  20 minutes in the professional and overall care of this patient.      Ha Balderas MD

## 2024-10-07 LAB
ALBUMIN SERPL BCP-MCNC: 2.6 G/DL (ref 3.4–5)
ANION GAP SERPL CALC-SCNC: 14 MMOL/L (ref 10–20)
BASOPHILS # BLD AUTO: 0.04 X10*3/UL (ref 0–0.1)
BASOPHILS NFR BLD AUTO: 0.3 %
BUN SERPL-MCNC: 35 MG/DL (ref 6–23)
CALCIUM SERPL-MCNC: 8.6 MG/DL (ref 8.6–10.3)
CHLORIDE SERPL-SCNC: 95 MMOL/L (ref 98–107)
CO2 SERPL-SCNC: 29 MMOL/L (ref 21–32)
CREAT SERPL-MCNC: 0.68 MG/DL (ref 0.5–1.05)
EGFRCR SERPLBLD CKD-EPI 2021: >90 ML/MIN/1.73M*2
EOSINOPHIL # BLD AUTO: 0.19 X10*3/UL (ref 0–0.4)
EOSINOPHIL NFR BLD AUTO: 1.3 %
ERYTHROCYTE [DISTWIDTH] IN BLOOD BY AUTOMATED COUNT: 18.4 % (ref 11.5–14.5)
GLUCOSE SERPL-MCNC: 148 MG/DL (ref 74–99)
HCT VFR BLD AUTO: 24.8 % (ref 36–46)
HGB BLD-MCNC: 7.9 G/DL (ref 12–16)
IMM GRANULOCYTES # BLD AUTO: 0.63 X10*3/UL (ref 0–0.5)
IMM GRANULOCYTES NFR BLD AUTO: 4.4 % (ref 0–0.9)
LYMPHOCYTES # BLD AUTO: 2.43 X10*3/UL (ref 0.8–3)
LYMPHOCYTES NFR BLD AUTO: 16.8 %
MCH RBC QN AUTO: 29.2 PG (ref 26–34)
MCHC RBC AUTO-ENTMCNC: 31.9 G/DL (ref 32–36)
MCV RBC AUTO: 92 FL (ref 80–100)
MONOCYTES # BLD AUTO: 0.84 X10*3/UL (ref 0.05–0.8)
MONOCYTES NFR BLD AUTO: 5.8 %
NEUTROPHILS # BLD AUTO: 10.32 X10*3/UL (ref 1.6–5.5)
NEUTROPHILS NFR BLD AUTO: 71.4 %
NRBC BLD-RTO: 2.9 /100 WBCS (ref 0–0)
PHOSPHATE SERPL-MCNC: 2.9 MG/DL (ref 2.5–4.9)
PLATELET # BLD AUTO: 342 X10*3/UL (ref 150–450)
POTASSIUM SERPL-SCNC: 4 MMOL/L (ref 3.5–5.3)
RBC # BLD AUTO: 2.71 X10*6/UL (ref 4–5.2)
SODIUM SERPL-SCNC: 134 MMOL/L (ref 136–145)
WBC # BLD AUTO: 14.5 X10*3/UL (ref 4.4–11.3)

## 2024-10-07 PROCEDURE — 36415 COLL VENOUS BLD VENIPUNCTURE: CPT | Performed by: INTERNAL MEDICINE

## 2024-10-07 PROCEDURE — 80069 RENAL FUNCTION PANEL: CPT | Performed by: INTERNAL MEDICINE

## 2024-10-07 PROCEDURE — 85025 COMPLETE CBC W/AUTO DIFF WBC: CPT | Performed by: INTERNAL MEDICINE

## 2024-10-08 LAB
ALBUMIN SERPL BCP-MCNC: 3 G/DL (ref 3.4–5)
ANION GAP SERPL CALC-SCNC: 14 MMOL/L (ref 10–20)
BUN SERPL-MCNC: 42 MG/DL (ref 6–23)
CALCIUM SERPL-MCNC: 8.9 MG/DL (ref 8.6–10.3)
CHLORIDE SERPL-SCNC: 94 MMOL/L (ref 98–107)
CO2 SERPL-SCNC: 30 MMOL/L (ref 21–32)
CREAT SERPL-MCNC: 0.92 MG/DL (ref 0.5–1.05)
EGFRCR SERPLBLD CKD-EPI 2021: 67 ML/MIN/1.73M*2
ERYTHROCYTE [DISTWIDTH] IN BLOOD BY AUTOMATED COUNT: 18.5 % (ref 11.5–14.5)
GLUCOSE SERPL-MCNC: 250 MG/DL (ref 74–99)
HCT VFR BLD AUTO: 27.6 % (ref 36–46)
HGB BLD-MCNC: 8.4 G/DL (ref 12–16)
MCH RBC QN AUTO: 28.7 PG (ref 26–34)
MCHC RBC AUTO-ENTMCNC: 30.4 G/DL (ref 32–36)
MCV RBC AUTO: 94 FL (ref 80–100)
NRBC BLD-RTO: 3.6 /100 WBCS (ref 0–0)
PHOSPHATE SERPL-MCNC: 4 MG/DL (ref 2.5–4.9)
PLATELET # BLD AUTO: 358 X10*3/UL (ref 150–450)
POTASSIUM SERPL-SCNC: 4.2 MMOL/L (ref 3.5–5.3)
RBC # BLD AUTO: 2.93 X10*6/UL (ref 4–5.2)
SODIUM SERPL-SCNC: 134 MMOL/L (ref 136–145)
WBC # BLD AUTO: 13.5 X10*3/UL (ref 4.4–11.3)

## 2024-10-08 PROCEDURE — 87075 CULTR BACTERIA EXCEPT BLOOD: CPT | Mod: PARLAB | Performed by: INTERNAL MEDICINE

## 2024-10-08 PROCEDURE — 36415 COLL VENOUS BLD VENIPUNCTURE: CPT | Performed by: INTERNAL MEDICINE

## 2024-10-08 PROCEDURE — 85027 COMPLETE CBC AUTOMATED: CPT | Performed by: INTERNAL MEDICINE

## 2024-10-08 PROCEDURE — 87205 SMEAR GRAM STAIN: CPT | Mod: PARLAB | Performed by: INTERNAL MEDICINE

## 2024-10-08 PROCEDURE — 80069 RENAL FUNCTION PANEL: CPT | Performed by: INTERNAL MEDICINE

## 2024-10-08 NOTE — PROGRESS NOTES
Subjective     Patient is alert today.  No voiced complaints.  Potassium today is 4.2  BUN/creatinine 42 and 0.92  Objective   Results for orders placed or performed during the hospital encounter of 09/26/24 (from the past 24 hour(s))   Renal function panel   Result Value Ref Range    Glucose 250 (H) 74 - 99 mg/dL    Sodium 134 (L) 136 - 145 mmol/L    Potassium 4.2 3.5 - 5.3 mmol/L    Chloride 94 (L) 98 - 107 mmol/L    Bicarbonate 30 21 - 32 mmol/L    Anion Gap 14 10 - 20 mmol/L    Urea Nitrogen 42 (H) 6 - 23 mg/dL    Creatinine 0.92 0.50 - 1.05 mg/dL    eGFR 67 >60 mL/min/1.73m*2    Calcium 8.9 8.6 - 10.3 mg/dL    Phosphorus 4.0 2.5 - 4.9 mg/dL    Albumin 3.0 (L) 3.4 - 5.0 g/dL   CBC   Result Value Ref Range    WBC 13.5 (H) 4.4 - 11.3 x10*3/uL    nRBC 3.6 (H) 0.0 - 0.0 /100 WBCs    RBC 2.93 (L) 4.00 - 5.20 x10*6/uL    Hemoglobin 8.4 (L) 12.0 - 16.0 g/dL    Hematocrit 27.6 (L) 36.0 - 46.0 %    MCV 94 80 - 100 fL    MCH 28.7 26.0 - 34.0 pg    MCHC 30.4 (L) 32.0 - 36.0 g/dL    RDW 18.5 (H) 11.5 - 14.5 %    Platelets 358 150 - 450 x10*3/uL                   Physical Exam    General Appearance; obtunded  HEENT; pupils react to light accommodation  Neck; no JVD no bruit no thyromegaly  Lungs; bibasilar crackles on inspiration  Heart; no gallop no rubs  Abdomen; active peristalsis  ; no CVA tenderness  Extremities; 2-3+ edema  Neurological; no clonus or asterixis  Musculoskeletal; decreased muscle tone       Assessment/Plan   Acute kidney injury  Funguria  Hypomagnesemia  UTI  Anemia  Hyperuricemia  Proteinuria  Transaminitis  Autoimmune hemolytic anemia  Protein calorie malnutrition  Anemia iron deficiency and chronic illness  Plan;   Patient to be switched to p.o. potassium  Assessment & Plan           I spent  20 minutes in the professional and overall care of this patient.      Ha E Andrey, MD

## 2024-10-09 LAB
ERYTHROCYTE [DISTWIDTH] IN BLOOD BY AUTOMATED COUNT: 18.9 % (ref 11.5–14.5)
HCT VFR BLD AUTO: 26.5 % (ref 36–46)
HGB BLD-MCNC: 8.1 G/DL (ref 12–16)
MCH RBC QN AUTO: 28.9 PG (ref 26–34)
MCHC RBC AUTO-ENTMCNC: 30.6 G/DL (ref 32–36)
MCV RBC AUTO: 95 FL (ref 80–100)
NRBC BLD-RTO: 3 /100 WBCS (ref 0–0)
PLATELET # BLD AUTO: 286 X10*3/UL (ref 150–450)
RBC # BLD AUTO: 2.8 X10*6/UL (ref 4–5.2)
WBC # BLD AUTO: 11.6 X10*3/UL (ref 4.4–11.3)

## 2024-10-09 PROCEDURE — 85027 COMPLETE CBC AUTOMATED: CPT | Performed by: INTERNAL MEDICINE

## 2024-10-09 NOTE — PROGRESS NOTES
Subjective       No voiced complaints.    Objective     Today  chemistries are pending  Units 07:00 1 d ago 2 d ago 3 d ago 6 d ago 7 d ago 8 d ago   WBC  4.4 - 11.3 x10*3/uL 11.6 High  13.5 High  14.5 High  14.6 High  14.8 High  15.4 High  16.1 High    nRBC  0.0 - 0.0 /100 WBCs 3.0 High  3.6 High  2.9 High  1.5 High  0.8 High  0.7 High  0.5 High    RBC  4.00 - 5.20 x10*6/uL 2.80 Low  2.93 Low  2.71 Low  2.61 Low  2.70 Low  2.64 Low  2.76 Low    Hemoglobin  12.0 - 16.0 g/dL 8.1 Low  8.4 Low  7.9 Low  7.6 Low  7.9 Low  7.6 Low  7.8 Low    Hematocrit  36.0 - 46.0 % 26.5 Low  27.6 Low  24.8 Low  23.6 Low  26.2 Low  25.6 Low  26.9 Low    MCV  80 - 100 fL 95 94 92 90 97 97 98   MCH  26.0 - 34.0 pg 28.9 28.7 29.2 29.1 29.3 28.8 28.3   MCHC  32.0 - 36.0 g/dL 30.6 Low  30.4 Low  31.9 Low  32.2 30.2 Low  29.7 Low  29.0 Low    RDW  11.5 - 14.5 % 18.9 High  18.5 High  18.4 High  17.8 High  18.5 High  19.0 High  19.2 High    Platelets  150 - 450 x10*3/uL 286 358 342 296 403 439 450   Resulting Agency [facility]              Specimen Collected: 10/09/24 07:00 Last Resulted: 10/09/24 07:59                        Physical Exam    General Appearance; obtunded  HEENT; pupils react to light accommodation  Neck; no JVD no bruit no thyromegaly  Lungs; bibasilar crackles on inspiration  Heart; no gallop no rubs  Abdomen; active peristalsis  ; no CVA tenderness  Extremities; 2-3+ edema  Neurological; no clonus or asterixis  Musculoskeletal; decreased muscle tone       Assessment/Plan   Acute kidney injury  Hypomagnesemia  Anemia  Hyperuricemia  Proteinuria  Transaminitis  Autoimmune hemolytic anemia  Protein calorie malnutrition  Anemia iron deficiency and chronic illness  Plan;   Repeat labs in the morning  Assessment & Plan           I spent  20 minutes in the professional and overall care of this patient.      Ha Balderas MD

## 2024-10-10 ENCOUNTER — HOSPITAL ENCOUNTER (OUTPATIENT)
Dept: RADIOLOGY | Facility: HOSPITAL | Age: 74
End: 2024-10-10
Payer: MEDICAID

## 2024-10-10 ENCOUNTER — HOSPITAL ENCOUNTER (OUTPATIENT)
Dept: RADIOLOGY | Facility: HOSPITAL | Age: 74
Discharge: HOME | End: 2024-10-10
Payer: MEDICAID

## 2024-10-10 LAB
ALBUMIN SERPL BCP-MCNC: 3.1 G/DL (ref 3.4–5)
ALP SERPL-CCNC: 96 U/L (ref 33–136)
ALT SERPL W P-5'-P-CCNC: 32 U/L (ref 7–45)
ANION GAP SERPL CALC-SCNC: 13 MMOL/L (ref 10–20)
AST SERPL W P-5'-P-CCNC: 28 U/L (ref 9–39)
BACTERIA SPEC CULT: ABNORMAL
BACTERIA SPEC RESP CULT: ABNORMAL
BILIRUB SERPL-MCNC: 0.4 MG/DL (ref 0–1.2)
BUN SERPL-MCNC: 44 MG/DL (ref 6–23)
CALCIUM SERPL-MCNC: 9.2 MG/DL (ref 8.6–10.3)
CHLORIDE SERPL-SCNC: 97 MMOL/L (ref 98–107)
CO2 SERPL-SCNC: 34 MMOL/L (ref 21–32)
CREAT SERPL-MCNC: 0.75 MG/DL (ref 0.5–1.05)
EGFRCR SERPLBLD CKD-EPI 2021: 85 ML/MIN/1.73M*2
ERYTHROCYTE [DISTWIDTH] IN BLOOD BY AUTOMATED COUNT: 19.4 % (ref 11.5–14.5)
GLUCOSE SERPL-MCNC: 161 MG/DL (ref 74–99)
GRAM STN SPEC: ABNORMAL
HCT VFR BLD AUTO: 29.2 % (ref 36–46)
HGB BLD-MCNC: 8.8 G/DL (ref 12–16)
MAGNESIUM SERPL-MCNC: 2.06 MG/DL (ref 1.6–2.4)
MCH RBC QN AUTO: 28.7 PG (ref 26–34)
MCHC RBC AUTO-ENTMCNC: 30.1 G/DL (ref 32–36)
MCV RBC AUTO: 95 FL (ref 80–100)
NRBC BLD-RTO: 1.7 /100 WBCS (ref 0–0)
PLATELET # BLD AUTO: 308 X10*3/UL (ref 150–450)
POTASSIUM SERPL-SCNC: 4.8 MMOL/L (ref 3.5–5.3)
PROT SERPL-MCNC: 6.7 G/DL (ref 6.4–8.2)
RBC # BLD AUTO: 3.07 X10*6/UL (ref 4–5.2)
SODIUM SERPL-SCNC: 139 MMOL/L (ref 136–145)
WBC # BLD AUTO: 12.1 X10*3/UL (ref 4.4–11.3)

## 2024-10-10 PROCEDURE — 85027 COMPLETE CBC AUTOMATED: CPT | Performed by: INTERNAL MEDICINE

## 2024-10-10 PROCEDURE — 87205 SMEAR GRAM STAIN: CPT | Mod: PARLAB | Performed by: INTERNAL MEDICINE

## 2024-10-10 PROCEDURE — 71045 X-RAY EXAM CHEST 1 VIEW: CPT | Performed by: RADIOLOGY

## 2024-10-10 PROCEDURE — 80053 COMPREHEN METABOLIC PANEL: CPT | Performed by: INTERNAL MEDICINE

## 2024-10-10 PROCEDURE — 71045 X-RAY EXAM CHEST 1 VIEW: CPT

## 2024-10-10 PROCEDURE — 83735 ASSAY OF MAGNESIUM: CPT | Performed by: INTERNAL MEDICINE

## 2024-10-10 PROCEDURE — 36415 COLL VENOUS BLD VENIPUNCTURE: CPT | Performed by: INTERNAL MEDICINE

## 2024-10-10 NOTE — PROGRESS NOTES
Subjective       No voiced complaints.    Objective     Today  chemistries are pending  Units 07:00 1 d ago 2 d ago 3 d ago 6 d ago 7 d ago 8 d ago   WBC  4.4 - 11.3 x10*3/uL 11.6 High  13.5 High  14.5 High  14.6 High  14.8 High  15.4 High  16.1 High    nRBC  0.0 - 0.0 /100 WBCs 3.0 High  3.6 High  2.9 High  1.5 High  0.8 High  0.7 High  0.5 High    RBC  4.00 - 5.20 x10*6/uL 2.80 Low  2.93 Low  2.71 Low  2.61 Low  2.70 Low  2.64 Low  2.76 Low    Hemoglobin  12.0 - 16.0 g/dL 8.1 Low  8.4 Low  7.9 Low  7.6 Low  7.9 Low  7.6 Low  7.8 Low    Hematocrit  36.0 - 46.0 % 26.5 Low  27.6 Low  24.8 Low  23.6 Low  26.2 Low  25.6 Low  26.9 Low    MCV  80 - 100 fL 95 94 92 90 97 97 98   MCH  26.0 - 34.0 pg 28.9 28.7 29.2 29.1 29.3 28.8 28.3   MCHC  32.0 - 36.0 g/dL 30.6 Low  30.4 Low  31.9 Low  32.2 30.2 Low  29.7 Low  29.0 Low    RDW  11.5 - 14.5 % 18.9 High  18.5 High  18.4 High  17.8 High  18.5 High  19.0 High  19.2 High    Platelets  150 - 450 x10*3/uL 286 358 342 296 403 439 450   Resulting Agency [facility]              Specimen Collected: 10/09/24 07:00 Last Resulted: 10/09/24 07:59                        Physical Exam    General Appearance; obtunded  HEENT; pupils react to light accommodation  Neck; no JVD no bruit no thyromegaly  Lungs; bibasilar crackles on inspiration  Heart; no gallop no rubs  Abdomen; active peristalsis  ; no CVA tenderness  Extremities; 2-3+ edema  Neurological; no clonus or asterixis  Musculoskeletal; decreased muscle tone     161 High  250 High  148 High  160 High  232 High  193 High  190 High     Sodium  136 - 145 mmol/L 139 134 Low  134 Low  128 Low  140 149 High  156 High    Potassium  3.5 - 5.3 mmol/L 4.8 4.2 4.0 3.1 Low  3.2 Low  2.7 Low Panic  3.0 Low    Chloride  98 - 107 mmol/L 97 Low  94 Low  95 Low  91 Low  103 109 High  114 High    Bicarbonate  21 - 32 mmol/L 34 High  30 29 29 26 29 29   Anion Gap  10 - 20 mmol/L 13 14 14 11 14 14 16   Urea Nitrogen  6 - 23 mg/dL 44  High  42 High  35 High  43 High  56 High  66 High  74 High    Creatinine  0.50 - 1.05 mg/dL 0.75 0.92 0.68 0.79 0.94 0.96 1.06 High    eGFR  >60 mL/min/1.73m*2 85 67 CM >90 CM 80 CM 65 CM 63 CM 56 Low  CM   Comment: Calculations of estimated GFR are performed using the 2021 CKD-EPI Study Refit equation without the race variable for the IDMS-Traceable creatinine methods.  https://jasn.asnjournals.org/content/early/2021/09/22/ASN.7027479754   Calcium  8.6 - 10.3 mg/dL 9.2 8.9 8.6 8.5 Low  8.8 8.5 Low  9.1   Albumin  3.4 - 5.0 g/dL 3.1 Low  3.0 Low  2.6 Low  2.6 Low  2.6 Low   2.5 Low    Alkaline Phosphatase  33 - 136 U/L 96   99 92  95   Total Protein  6.4 - 8.2 g/dL 6.7   6.3 Low  6.5  6.5   AST  9 - 39 U/L 28   30 33  38   Bilirubin, Total  0.0 - 1.2 mg/dL 0.4   0.4 0.4  0.4   ALT  7 - 45 U/L 32   37 CM 47 High  CM  5     Component  Ref Range & Units 06:20 1 d ago 2 d ago 3 d ago 4 d ago 7 d ago 8 d ago   WBC  4.4 - 11.3 x10*3/uL 12.1 High  11.6 High  13.5 High  14.5 High  14.6 High  14.8 High  15.4 High    nRBC  0.0 - 0.0 /100 WBCs 1.7 High  3.0 High  3.6 High  2.9 High  1.5 High  0.8 High  0.7 High    RBC  4.00 - 5.20 x10*6/uL 3.07 Low  2.80 Low  2.93 Low  2.71 Low  2.61 Low  2.70 Low  2.64 Low    Hemoglobin  12.0 - 16.0 g/dL 8.8 Low  8.1 Low  8.4 Low  7.9 Low  7.6 Low  7.9 Low  7.6 Low    Hematocrit  36.0 - 46.0 % 29.2 Low  26.5 Low  27.6 Low  24.8 Low  23.6 Low  26.2 Low  25.6 Low    MCV  80 - 100 fL 95 95 94 92 90 97 97   MCH  26.0 - 34.0 pg 28.7 28.9 28.7 29.2 29.1 29.3 28.8   MCHC  32.0 - 36.0 g/dL 30.1 Low  30.6 Low  30.4 Low  31.9 Low  32.2 30.2 Low  29.7 Low    RDW  11.5 - 14.5 % 19.4 High  18.9 High  18.5 High  18.4 High  17.8 High  18.5 High  19.0 High    Platelets  150 - 450 x10*3/uL 308 286 358 342 296 403 439         Assessment/Plan   Acute kidney injury  Hypomagnesemia  Anemia  Hyperuricemia  Proteinuria  Transaminitis  Autoimmune hemolytic anemia  Protein calorie malnutrition  Anemia iron deficiency  and chronic illness  Plan;   Repeat labs in the morning  Assessment & Plan           I spent  20 minutes in the professional and overall care of this patient.      Ha Balderas MD

## 2024-10-11 NOTE — PROGRESS NOTES
Subjective       No voiced complaints.    Objective     Today  chemistries are pending  Units 07:00 1 d ago 2 d ago 3 d ago 6 d ago 7 d ago 8 d ago   WBC  4.4 - 11.3 x10*3/uL 11.6 High  13.5 High  14.5 High  14.6 High  14.8 High  15.4 High  16.1 High    nRBC  0.0 - 0.0 /100 WBCs 3.0 High  3.6 High  2.9 High  1.5 High  0.8 High  0.7 High  0.5 High    RBC  4.00 - 5.20 x10*6/uL 2.80 Low  2.93 Low  2.71 Low  2.61 Low  2.70 Low  2.64 Low  2.76 Low    Hemoglobin  12.0 - 16.0 g/dL 8.1 Low  8.4 Low  7.9 Low  7.6 Low  7.9 Low  7.6 Low  7.8 Low    Hematocrit  36.0 - 46.0 % 26.5 Low  27.6 Low  24.8 Low  23.6 Low  26.2 Low  25.6 Low  26.9 Low    MCV  80 - 100 fL 95 94 92 90 97 97 98   MCH  26.0 - 34.0 pg 28.9 28.7 29.2 29.1 29.3 28.8 28.3   MCHC  32.0 - 36.0 g/dL 30.6 Low  30.4 Low  31.9 Low  32.2 30.2 Low  29.7 Low  29.0 Low    RDW  11.5 - 14.5 % 18.9 High  18.5 High  18.4 High  17.8 High  18.5 High  19.0 High  19.2 High    Platelets  150 - 450 x10*3/uL 286 358 342 296 403 439 450   Resulting Agency [facility]              Specimen Collected: 10/09/24 07:00 Last Resulted: 10/09/24 07:59                        Physical Exam    General Appearance; obtunded  HEENT; pupils react to light accommodation  Neck; no JVD no bruit no thyromegaly  Lungs; bibasilar crackles on inspiration  Heart; no gallop no rubs  Abdomen; active peristalsis  ; no CVA tenderness  Extremities; 2-3+ edema  Neurological; no clonus or asterixis  Musculoskeletal; decreased muscle tone     161 High  250 High  148 High  160 High  232 High  193 High  190 High     Sodium  136 - 145 mmol/L 139 134 Low  134 Low  128 Low  140 149 High  156 High    Potassium  3.5 - 5.3 mmol/L 4.8 4.2 4.0 3.1 Low  3.2 Low  2.7 Low Panic  3.0 Low    Chloride  98 - 107 mmol/L 97 Low  94 Low  95 Low  91 Low  103 109 High  114 High    Bicarbonate  21 - 32 mmol/L 34 High  30 29 29 26 29 29   Anion Gap  10 - 20 mmol/L 13 14 14 11 14 14 16   Urea Nitrogen  6 - 23 mg/dL 44  High  42 High  35 High  43 High  56 High  66 High  74 High    Creatinine  0.50 - 1.05 mg/dL 0.75 0.92 0.68 0.79 0.94 0.96 1.06 High    eGFR  >60 mL/min/1.73m*2 85 67 CM >90 CM 80 CM 65 CM 63 CM 56 Low  CM   Comment: Calculations of estimated GFR are performed using the 2021 CKD-EPI Study Refit equation without the race variable for the IDMS-Traceable creatinine methods.  https://jasn.asnjournals.org/content/early/2021/09/22/ASN.3368785524   Calcium  8.6 - 10.3 mg/dL 9.2 8.9 8.6 8.5 Low  8.8 8.5 Low  9.1   Albumin  3.4 - 5.0 g/dL 3.1 Low  3.0 Low  2.6 Low  2.6 Low  2.6 Low   2.5 Low    Alkaline Phosphatase  33 - 136 U/L 96   99 92  95   Total Protein  6.4 - 8.2 g/dL 6.7   6.3 Low  6.5  6.5   AST  9 - 39 U/L 28   30 33  38   Bilirubin, Total  0.0 - 1.2 mg/dL 0.4   0.4 0.4  0.4   ALT  7 - 45 U/L 32   37 CM 47 High  CM  5     Component  Ref Range & Units 06:20 1 d ago 2 d ago 3 d ago 4 d ago 7 d ago 8 d ago   WBC  4.4 - 11.3 x10*3/uL 12.1 High  11.6 High  13.5 High  14.5 High  14.6 High  14.8 High  15.4 High    nRBC  0.0 - 0.0 /100 WBCs 1.7 High  3.0 High  3.6 High  2.9 High  1.5 High  0.8 High  0.7 High    RBC  4.00 - 5.20 x10*6/uL 3.07 Low  2.80 Low  2.93 Low  2.71 Low  2.61 Low  2.70 Low  2.64 Low    Hemoglobin  12.0 - 16.0 g/dL 8.8 Low  8.1 Low  8.4 Low  7.9 Low  7.6 Low  7.9 Low  7.6 Low    Hematocrit  36.0 - 46.0 % 29.2 Low  26.5 Low  27.6 Low  24.8 Low  23.6 Low  26.2 Low  25.6 Low    MCV  80 - 100 fL 95 95 94 92 90 97 97   MCH  26.0 - 34.0 pg 28.7 28.9 28.7 29.2 29.1 29.3 28.8   MCHC  32.0 - 36.0 g/dL 30.1 Low  30.6 Low  30.4 Low  31.9 Low  32.2 30.2 Low  29.7 Low    RDW  11.5 - 14.5 % 19.4 High  18.9 High  18.5 High  18.4 High  17.8 High  18.5 High  19.0 High    Platelets  150 - 450 x10*3/uL 308 286 358 342 296 403 439         Assessment/Plan   Acute kidney injury  Hypomagnesemia  Anemia  Hyperuricemia  Proteinuria  Transaminitis  Autoimmune hemolytic anemia  Protein calorie malnutrition  Anemia iron deficiency  and chronic illness  Plan;   As per infectious disease consultant  Continue current therapy  Assessment & Plan           I spent  20 minutes in the professional and overall care of this patient.      Ha Balderas MD

## 2024-10-12 ENCOUNTER — HOSPITAL ENCOUNTER (OUTPATIENT)
Dept: RADIOLOGY | Facility: HOSPITAL | Age: 74
Discharge: HOME | End: 2024-10-12
Payer: MEDICAID

## 2024-10-12 PROCEDURE — 74018 RADEX ABDOMEN 1 VIEW: CPT

## 2024-10-12 PROCEDURE — 74018 RADEX ABDOMEN 1 VIEW: CPT | Performed by: RADIOLOGY

## 2024-10-12 NOTE — PROGRESS NOTES
Subjective       No voiced complaints.    Objective     Today  chemistries are pending  Units 07:00 1 d ago 2 d ago 3 d ago 6 d ago 7 d ago 8 d ago   WBC  4.4 - 11.3 x10*3/uL 11.6 High  13.5 High  14.5 High  14.6 High  14.8 High  15.4 High  16.1 High    nRBC  0.0 - 0.0 /100 WBCs 3.0 High  3.6 High  2.9 High  1.5 High  0.8 High  0.7 High  0.5 High    RBC  4.00 - 5.20 x10*6/uL 2.80 Low  2.93 Low  2.71 Low  2.61 Low  2.70 Low  2.64 Low  2.76 Low    Hemoglobin  12.0 - 16.0 g/dL 8.1 Low  8.4 Low  7.9 Low  7.6 Low  7.9 Low  7.6 Low  7.8 Low    Hematocrit  36.0 - 46.0 % 26.5 Low  27.6 Low  24.8 Low  23.6 Low  26.2 Low  25.6 Low  26.9 Low    MCV  80 - 100 fL 95 94 92 90 97 97 98   MCH  26.0 - 34.0 pg 28.9 28.7 29.2 29.1 29.3 28.8 28.3   MCHC  32.0 - 36.0 g/dL 30.6 Low  30.4 Low  31.9 Low  32.2 30.2 Low  29.7 Low  29.0 Low    RDW  11.5 - 14.5 % 18.9 High  18.5 High  18.4 High  17.8 High  18.5 High  19.0 High  19.2 High    Platelets  150 - 450 x10*3/uL 286 358 342 296 403 439 450   Resulting Agency [facility]              Specimen Collected: 10/09/24 07:00 Last Resulted: 10/09/24 07:59                        Physical Exam    General Appearance; obtunded  HEENT; pupils react to light accommodation  Neck; no JVD no bruit no thyromegaly  Lungs; bibasilar crackles on inspiration  Heart; no gallop no rubs  Abdomen; active peristalsis  ; no CVA tenderness  Extremities; 2-3+ edema  Neurological; no clonus or asterixis  Musculoskeletal; decreased muscle tone     Yesterday labs  161 High  250 High  148 High  160 High  232 High  193 High  190 High     Sodium  136 - 145 mmol/L 139 134 Low  134 Low  128 Low  140 149 High  156 High    Potassium  3.5 - 5.3 mmol/L 4.8 4.2 4.0 3.1 Low  3.2 Low  2.7 Low Panic  3.0 Low    Chloride  98 - 107 mmol/L 97 Low  94 Low  95 Low  91 Low  103 109 High  114 High    Bicarbonate  21 - 32 mmol/L 34 High  30 29 29 26 29 29   Anion Gap  10 - 20 mmol/L 13 14 14 11 14 14 16   Urea  Nitrogen  6 - 23 mg/dL 44 High  42 High  35 High  43 High  56 High  66 High  74 High    Creatinine  0.50 - 1.05 mg/dL 0.75 0.92 0.68 0.79 0.94 0.96 1.06 High    eGFR  >60 mL/min/1.73m*2 85 67 CM >90 CM 80 CM 65 CM 63 CM 56 Low  CM   Comment: Calculations of estimated GFR are performed using the 2021 CKD-EPI Study Refit equation without the race variable for the IDMS-Traceable creatinine methods.  https://jasn.asnjournals.org/content/early/2021/09/22/ASN.6581204036   Calcium  8.6 - 10.3 mg/dL 9.2 8.9 8.6 8.5 Low  8.8 8.5 Low  9.1   Albumin  3.4 - 5.0 g/dL 3.1 Low  3.0 Low  2.6 Low  2.6 Low  2.6 Low   2.5 Low    Alkaline Phosphatase  33 - 136 U/L 96   99 92  95   Total Protein  6.4 - 8.2 g/dL 6.7   6.3 Low  6.5  6.5   AST  9 - 39 U/L 28   30 33  38   Bilirubin, Total  0.0 - 1.2 mg/dL 0.4   0.4 0.4  0.4   ALT  7 - 45 U/L 32   37 CM 47 High  CM  5     Component  Ref Range & Units 06:20 1 d ago 2 d ago 3 d ago 4 d ago 7 d ago 8 d ago   WBC  4.4 - 11.3 x10*3/uL 12.1 High  11.6 High  13.5 High  14.5 High  14.6 High  14.8 High  15.4 High    nRBC  0.0 - 0.0 /100 WBCs 1.7 High  3.0 High  3.6 High  2.9 High  1.5 High  0.8 High  0.7 High    RBC  4.00 - 5.20 x10*6/uL 3.07 Low  2.80 Low  2.93 Low  2.71 Low  2.61 Low  2.70 Low  2.64 Low    Hemoglobin  12.0 - 16.0 g/dL 8.8 Low  8.1 Low  8.4 Low  7.9 Low  7.6 Low  7.9 Low  7.6 Low    Hematocrit  36.0 - 46.0 % 29.2 Low  26.5 Low  27.6 Low  24.8 Low  23.6 Low  26.2 Low  25.6 Low    MCV  80 - 100 fL 95 95 94 92 90 97 97   MCH  26.0 - 34.0 pg 28.7 28.9 28.7 29.2 29.1 29.3 28.8   MCHC  32.0 - 36.0 g/dL 30.1 Low  30.6 Low  30.4 Low  31.9 Low  32.2 30.2 Low  29.7 Low    RDW  11.5 - 14.5 % 19.4 High  18.9 High  18.5 High  18.4 High  17.8 High  18.5 High  19.0 High    Platelets  150 - 450 x10*3/uL 308 286 358 342 296 403 439         Assessment/Plan   Acute kidney injury  Hypomagnesemia  Anemia  Hyperuricemia  Proteinuria  Transaminitis  Autoimmune hemolytic anemia  Protein calorie  malnutrition  Anemia iron deficiency and chronic illness  Plan;   Labs today are pending  Assessment & Plan           I spent  20 minutes in the professional and overall care of this patient.      Ha Balderas MD

## 2024-10-13 LAB
ALBUMIN SERPL BCP-MCNC: 2.9 G/DL (ref 3.4–5)
ALP SERPL-CCNC: 92 U/L (ref 33–136)
ALT SERPL W P-5'-P-CCNC: 28 U/L (ref 7–45)
ANION GAP SERPL CALC-SCNC: 11 MMOL/L (ref 10–20)
AST SERPL W P-5'-P-CCNC: 29 U/L (ref 9–39)
BILIRUB SERPL-MCNC: 0.5 MG/DL (ref 0–1.2)
BUN SERPL-MCNC: 40 MG/DL (ref 6–23)
CALCIUM SERPL-MCNC: 9.1 MG/DL (ref 8.6–10.3)
CHLORIDE SERPL-SCNC: 100 MMOL/L (ref 98–107)
CO2 SERPL-SCNC: 38 MMOL/L (ref 21–32)
CREAT SERPL-MCNC: 0.71 MG/DL (ref 0.5–1.05)
EGFRCR SERPLBLD CKD-EPI 2021: >90 ML/MIN/1.73M*2
ERYTHROCYTE [DISTWIDTH] IN BLOOD BY AUTOMATED COUNT: 19.2 % (ref 11.5–14.5)
GLUCOSE SERPL-MCNC: 218 MG/DL (ref 74–99)
GRAM STN SPEC: NORMAL
GRAM STN SPEC: NORMAL
HCT VFR BLD AUTO: 30.7 % (ref 36–46)
HGB BLD-MCNC: 9.2 G/DL (ref 12–16)
MCH RBC QN AUTO: 28.5 PG (ref 26–34)
MCHC RBC AUTO-ENTMCNC: 30 G/DL (ref 32–36)
MCV RBC AUTO: 95 FL (ref 80–100)
NRBC BLD-RTO: 0.6 /100 WBCS (ref 0–0)
PLATELET # BLD AUTO: 272 X10*3/UL (ref 150–450)
POTASSIUM SERPL-SCNC: 3.5 MMOL/L (ref 3.5–5.3)
PREALB SERPL-MCNC: 35.4 MG/DL (ref 18–40)
PROT SERPL-MCNC: 6.5 G/DL (ref 6.4–8.2)
RBC # BLD AUTO: 3.23 X10*6/UL (ref 4–5.2)
SODIUM SERPL-SCNC: 145 MMOL/L (ref 136–145)
WBC # BLD AUTO: 10.6 X10*3/UL (ref 4.4–11.3)

## 2024-10-13 PROCEDURE — 80053 COMPREHEN METABOLIC PANEL: CPT | Performed by: INTERNAL MEDICINE

## 2024-10-13 PROCEDURE — 87205 SMEAR GRAM STAIN: CPT | Mod: PARLAB | Performed by: INTERNAL MEDICINE

## 2024-10-13 PROCEDURE — 85027 COMPLETE CBC AUTOMATED: CPT | Performed by: INTERNAL MEDICINE

## 2024-10-13 PROCEDURE — 36415 COLL VENOUS BLD VENIPUNCTURE: CPT | Performed by: INTERNAL MEDICINE

## 2024-10-13 PROCEDURE — 87070 CULTURE OTHR SPECIMN AEROBIC: CPT | Mod: PARLAB | Performed by: INTERNAL MEDICINE

## 2024-10-13 PROCEDURE — 84134 ASSAY OF PREALBUMIN: CPT | Mod: PARLAB | Performed by: INTERNAL MEDICINE

## 2024-10-13 NOTE — PROGRESS NOTES
Subjective   Todays Labs      Objective     Today  chemistries    Component  Ref Range & Units 06:48 1 d ago 4 d ago 5 d ago 6 d ago 7 d ago 8 d ago   WBC  4.4 - 11.3 x10*3/uL 11.0 10.6 12.1 High  11.6 High  13.5 High  14.5 High  14.6 High    nRBC  0.0 - 0.0 /100 WBCs 0.4 High  0.6 High  1.7 High  3.0 High  3.6 High  2.9 High  1.5 High    RBC  4.00 - 5.20 x10*6/uL 3.43 Low  3.23 Low  3.07 Low  2.80 Low  2.93 Low  2.71 Low  2.61 Low    Hemoglobin  12.0 - 16.0 g/dL 9.8 Low  9.2 Low  8.8 Low  8.1 Low  8.4 Low  7.9 Low  7.6 Low    Hematocrit  36.0 - 46.0 % 32.7 Low  30.7 Low  29.2 Low  26.5 Low  27.6 Low  24.8 Low  23.6 Low    MCV  80 - 100 fL 95 95 95 95 94 92 90   MCH  26.0 - 34.0 pg 28.6 28.5 28.7 28.9 28.7 29.2 29.           Physical Exam    General Appearance; Alert  HEENT; pupils react to light accommodation  Neck; no JVD no bruit no thyromegaly  Lungs; bibasilar crackles on inspiration  Heart; no gallop no rubs  Abdomen; active peristalsis  ; no CVA tenderness  Extremities; 2-3+ edema  Neurological; no clonus or asterixis  Musculoskeletal; decreased muscle tone             Assessment/Plan   Acute kidney injury  Anemia  Hyperuricemia  Proteinuria  Transaminitis  Autoimmune hemolytic anemia  Protein calorie malnutrition  Anemia iron deficiency and chronic illness  Plan;   Monitor Labs  Assessment & Plan           I spent  20 minutes in the professional and overall care of this patient.      Ha Balderas MD

## 2024-10-14 ENCOUNTER — HOSPITAL ENCOUNTER (OUTPATIENT)
Dept: RADIOLOGY | Facility: HOSPITAL | Age: 74
End: 2024-10-14
Payer: MEDICAID

## 2024-10-14 LAB
ERYTHROCYTE [DISTWIDTH] IN BLOOD BY AUTOMATED COUNT: 19.1 % (ref 11.5–14.5)
HCT VFR BLD AUTO: 32.7 % (ref 36–46)
HGB BLD-MCNC: 9.8 G/DL (ref 12–16)
MCH RBC QN AUTO: 28.6 PG (ref 26–34)
MCHC RBC AUTO-ENTMCNC: 30 G/DL (ref 32–36)
MCV RBC AUTO: 95 FL (ref 80–100)
NRBC BLD-RTO: 0.4 /100 WBCS (ref 0–0)
PLATELET # BLD AUTO: 276 X10*3/UL (ref 150–450)
RBC # BLD AUTO: 3.43 X10*6/UL (ref 4–5.2)
WBC # BLD AUTO: 11 X10*3/UL (ref 4.4–11.3)

## 2024-10-14 PROCEDURE — 36415 COLL VENOUS BLD VENIPUNCTURE: CPT | Performed by: INTERNAL MEDICINE

## 2024-10-14 PROCEDURE — 85027 COMPLETE CBC AUTOMATED: CPT | Performed by: INTERNAL MEDICINE

## 2024-10-15 ENCOUNTER — HOSPITAL ENCOUNTER (OUTPATIENT)
Dept: RADIOLOGY | Facility: HOSPITAL | Age: 74
Discharge: HOME | End: 2024-10-15
Payer: MEDICAID

## 2024-10-15 LAB
BACTERIA SPEC RESP CULT: NORMAL
GRAM STN SPEC: NORMAL
GRAM STN SPEC: NORMAL

## 2024-10-15 PROCEDURE — 43752 NASAL/OROGASTRIC W/TUBE PLMT: CPT

## 2024-10-15 PROCEDURE — 43752 NASAL/OROGASTRIC W/TUBE PLMT: CPT | Performed by: STUDENT IN AN ORGANIZED HEALTH CARE EDUCATION/TRAINING PROGRAM

## 2024-10-15 NOTE — PRE-PROCEDURE NOTE
Interventional Radiology Preprocedure Note    Indication for procedure: NJ tube placement    Relevant Labs:   Lab Results   Component Value Date    CREATININE 0.71 10/13/2024    EGFR >90 10/13/2024       Planned Sedation/Anesthesia: No sedation    Benefits, risks and alternatives of procedure and planned sedation have been discussed with the patient and/or their representative. All questions answered and they agree to proceed.     Maykel Quiroz is appropriate candidate for NJ tube placement

## 2024-10-15 NOTE — PROGRESS NOTES
Subjective     She denies any voiced complaints  Todays Labs      Objective     Today's labs are pending            Physical Exam    General Appearance; Alert  HEENT; pupils react to light accommodation  Neck; no JVD no bruit no thyromegaly  Lungs; bibasilar crackles on inspiration  Heart; no gallop no rubs  Abdomen; active peristalsis  ; no CVA tenderness  Extremities; 2-3+ edema  Neurological; no clonus or asterixis  Musculoskeletal; decreased muscle tone             Assessment/Plan   Acute kidney injury  Anemia  Hyperuricemia  Proteinuria  Transaminitis  Autoimmune hemolytic anemia  Protein calorie malnutrition  Anemia iron deficiency and chronic illness  Plan;   Monitor Labs  Assessment & Plan           I spent  20 minutes in the professional and overall care of this patient.      Ha Balderas MD

## 2024-10-15 NOTE — POST-PROCEDURE NOTE
Interventional Radiology Brief Postprocedure Note    Attending: Jamal Glass MD    Description of procedure: Fluoroscopy guided NGT placement      Anesthesia:  None    Complications: None    Estimated Blood Loss: none    Medications (Filter: Administrations occurring from 1511 to 1511 on 10/15/24)      None          * Cannot find log *      See detailed result report with images in PACS.    The patient tolerated the procedure well without incident or complication and is in stable condition.

## 2024-10-16 LAB
ALBUMIN SERPL BCP-MCNC: 2.7 G/DL (ref 3.4–5)
ALP SERPL-CCNC: 83 U/L (ref 33–136)
ALT SERPL W P-5'-P-CCNC: 30 U/L (ref 7–45)
ANION GAP SERPL CALC-SCNC: 12 MMOL/L (ref 10–20)
AST SERPL W P-5'-P-CCNC: 37 U/L (ref 9–39)
BILIRUB SERPL-MCNC: 0.7 MG/DL (ref 0–1.2)
BUN SERPL-MCNC: 32 MG/DL (ref 6–23)
CALCIUM SERPL-MCNC: 9.1 MG/DL (ref 8.6–10.3)
CHLORIDE SERPL-SCNC: 100 MMOL/L (ref 98–107)
CO2 SERPL-SCNC: 36 MMOL/L (ref 21–32)
CREAT SERPL-MCNC: 0.59 MG/DL (ref 0.5–1.05)
EGFRCR SERPLBLD CKD-EPI 2021: >90 ML/MIN/1.73M*2
ERYTHROCYTE [DISTWIDTH] IN BLOOD BY AUTOMATED COUNT: 18.8 % (ref 11.5–14.5)
GLUCOSE SERPL-MCNC: 77 MG/DL (ref 74–99)
HCT VFR BLD AUTO: 31.7 % (ref 36–46)
HGB BLD-MCNC: 9.5 G/DL (ref 12–16)
MAGNESIUM SERPL-MCNC: 1.83 MG/DL (ref 1.6–2.4)
MCH RBC QN AUTO: 28.4 PG (ref 26–34)
MCHC RBC AUTO-ENTMCNC: 30 G/DL (ref 32–36)
MCV RBC AUTO: 95 FL (ref 80–100)
NRBC BLD-RTO: 0 /100 WBCS (ref 0–0)
PLATELET # BLD AUTO: 248 X10*3/UL (ref 150–450)
POTASSIUM SERPL-SCNC: 3.1 MMOL/L (ref 3.5–5.3)
PREALB SERPL-MCNC: 35.1 MG/DL (ref 18–40)
PROT SERPL-MCNC: 6.1 G/DL (ref 6.4–8.2)
RBC # BLD AUTO: 3.35 X10*6/UL (ref 4–5.2)
SODIUM SERPL-SCNC: 145 MMOL/L (ref 136–145)
WBC # BLD AUTO: 11.8 X10*3/UL (ref 4.4–11.3)

## 2024-10-16 PROCEDURE — 83735 ASSAY OF MAGNESIUM: CPT | Performed by: INTERNAL MEDICINE

## 2024-10-16 PROCEDURE — 84134 ASSAY OF PREALBUMIN: CPT | Mod: PARLAB | Performed by: INTERNAL MEDICINE

## 2024-10-16 PROCEDURE — 36415 COLL VENOUS BLD VENIPUNCTURE: CPT | Performed by: INTERNAL MEDICINE

## 2024-10-16 PROCEDURE — 84075 ASSAY ALKALINE PHOSPHATASE: CPT | Performed by: INTERNAL MEDICINE

## 2024-10-16 PROCEDURE — 85027 COMPLETE CBC AUTOMATED: CPT | Performed by: INTERNAL MEDICINE

## 2024-10-16 NOTE — PROGRESS NOTES
Subjective     She denies any voiced complaints  Patient  had fluoroscopic guided NG tube placement by interventional radiologist      Objective     Today's labs   Status: Final result       Visible to patient: No (inaccessible in [facility])    0 Result Notes       Component  Ref Range & Units 06:46 3 d ago   Prealbumin  18.0 - 40.0 mg/dL 35.1 35.4                 Component  Ref Range & Units 06:46  (10/16/24) 6 d ago  (10/10/24) 13 d ago  (10/3/24) 2 wk ago  (10/2/24) 2 wk ago  (10/1/24) 2 wk ago  (9/30/24)   Magnesium  1.60 - 2.40 mg/dL 1.83 2.06 1.82 1.63 2.10 1.56 Low    Resulting Agency [facility]      Comprehensive metabolic panel  Order: [id number]   Status: Final result       Visible to patient: No (inaccessible in [facility])    0 Result Notes            Component  Ref Range & Units 06:46 3 d ago 6 d ago 8 d ago 9 d ago 10 d ago 13 d ago   Glucose  74 - 99 mg/dL 77 218 High  161 High  250 High  148 High  160 High  232 High    Sodium  136 - 145 mmol/L 145 145 139 134 Low  134 Low  128 Low  140   Potassium  3.5 - 5.3 mmol/L 3.1 Low  3.5 4.8 4.2 4.0 3.1 Low  3.2 Low    Chloride  98 - 107 mmol/L 100 100 97 Low  94 Low  95 Low  91 Low  103   Bicarbonate  21 - 32 mmol/L 36 High  38 High  34 High  30 29 29 26   Anion Gap  10 - 20 mmol/L 12 11 13 14 14 11 14   Urea Nitrogen  6 - 23 mg/dL 32 High  40 High  44 High  42 High  35 High  43 High  56 High    Creatinine  0.50 - 1.05 mg/dL 0.59 0.71 0.75 0.92 0.68 0.79 0.94   eGFR  >60 mL/min/1.73m*2 >90 >90 CM 85 CM 67 CM >90 CM 80 CM 65 CM   Comment: Calculations of estimated GFR are performed using the 2021 CKD-EPI Study Refit equation without the race variable for the IDMS-Traceable creatinine methods.  https://jasn.asnjournals.org/content/early/2021/09/22/ASN.9869284282   Calcium  8.6 - 10.3 mg/dL 9.1 9.1 9.2 8.9 8.6 8.5 Low  8.8   Albumin  3.4 - 5.0 g/dL 2.7 Low  2.9 Low  3.1 Low  3.0 Low  2.6 Low  2.6 Low  2.6 Low    Alkaline Phosphatase  33 - 136 U/L  83 92 96   99 92   Total Protein  6.4 - 8.2 g/dL 6.1 Low  6.5 6.7   6.3 Low  6.5   AST  9 - 39 U/L 37 29 28   30 33   Bilirubin, Total  0.0 - 1.2 mg/dL 0.7 0.5 0.4   0.4 0.4   ALT  7 - 45 U/L 30 28 C             WBC  4.4 - 11.3 x10*3/uL 11.8 High  11.0 10.6 12.1 High  11.6 High  13.5 High  14.5 High    nRBC  0.0 - 0.0 /100 WBCs 0.0 0.4 High  0.6 High  1.7 High  3.0 High  3.6 High  2.9 High    RBC  4.00 - 5.20 x10*6/uL 3.35 Low  3.43 Low  3.23 Low  3.07 Low  2.80 Low  2.93 Low  2.71 Low    Hemoglobin  12.0 - 16.0 g/dL 9.5 Low  9.8 Low  9.2 Low  8.8 Low  8.1 Low  8.4 Low  7.9 Low    Hematocrit  36.0 - 46.0 % 31.7 Low  32.7 Low  30.7 Low  29.2 Low  26.5 Low  27.6 Low  24.8 Low    MCV  80 - 100 fL 95 95 95 95 95 94 92   MCH  26.0 - 34.0 pg 28.4 28.6 28.5 28.7 28.9 28.7 29.2   MCHC  32.0 - 36.0 g/dL 30.0 Low  30.0 Low  30.0 Low  30.1 Low  30.6 Low  30.4 Low  31.9 Low    RDW  11.5 - 14.5 % 18.8 High  19.1 High  19.2 High  19.4 High  18.9 High  18.5 High  18.4 High    Platelets  150 - 450 x10*3/uL 248 276 272 308 286 358 342   Resulting Agency [facility]         Physical Exam    General Appearance; Alert  HEENT; pupils react to light accommodation  Neck; no JVD no bruit no thyromegaly  Lungs; bibasilar crackles on inspiration  Heart; no gallop no rubs  Abdomen; active peristalsis  ; no CVA tenderness  Extremities; 2-3+ edema  Neurological; no clonus or asterixis  Musculoskeletal; decreased muscle tone             Assessment/Plan   Acute kidney injury  Anemia  Hyperuricemia  Proteinuria  Transaminitis  Autoimmune hemolytic anemia  Protein calorie malnutrition  Anemia iron deficiency and chronic illness  Plan;   Monitor Labs  Assessment & Plan           I spent  20 minutes in the professional and overall care of this patient.      Ha Balderas MD

## 2024-10-17 LAB
BASOPHILS # BLD AUTO: 0.02 X10*3/UL (ref 0–0.1)
BASOPHILS NFR BLD AUTO: 0.2 %
EOSINOPHIL # BLD AUTO: 0.29 X10*3/UL (ref 0–0.4)
EOSINOPHIL NFR BLD AUTO: 2.7 %
ERYTHROCYTE [DISTWIDTH] IN BLOOD BY AUTOMATED COUNT: 17.9 % (ref 11.5–14.5)
HCT VFR BLD AUTO: 31 % (ref 36–46)
HGB BLD-MCNC: 9.5 G/DL (ref 12–16)
IMM GRANULOCYTES # BLD AUTO: 0.05 X10*3/UL (ref 0–0.5)
IMM GRANULOCYTES NFR BLD AUTO: 0.5 % (ref 0–0.9)
LYMPHOCYTES # BLD AUTO: 2.19 X10*3/UL (ref 0.8–3)
LYMPHOCYTES NFR BLD AUTO: 20.1 %
MCH RBC QN AUTO: 29 PG (ref 26–34)
MCHC RBC AUTO-ENTMCNC: 30.6 G/DL (ref 32–36)
MCV RBC AUTO: 95 FL (ref 80–100)
MONOCYTES # BLD AUTO: 0.38 X10*3/UL (ref 0.05–0.8)
MONOCYTES NFR BLD AUTO: 3.5 %
NEUTROPHILS # BLD AUTO: 7.99 X10*3/UL (ref 1.6–5.5)
NEUTROPHILS NFR BLD AUTO: 73 %
NRBC BLD-RTO: 0 /100 WBCS (ref 0–0)
PLATELET # BLD AUTO: 217 X10*3/UL (ref 150–450)
RBC # BLD AUTO: 3.28 X10*6/UL (ref 4–5.2)
WBC # BLD AUTO: 10.9 X10*3/UL (ref 4.4–11.3)

## 2024-10-17 PROCEDURE — 36415 COLL VENOUS BLD VENIPUNCTURE: CPT | Performed by: INTERNAL MEDICINE

## 2024-10-17 PROCEDURE — 85025 COMPLETE CBC W/AUTO DIFF WBC: CPT | Performed by: INTERNAL MEDICINE

## 2024-10-17 NOTE — PROGRESS NOTES
Subjective     She denies any voiced complaints      Objective     Today's labs     Contains abnormal data CBC and Auto Differential  Order: [id number]   Status: Final result       Visible to patient: No (inaccessible in [facility])    0 Result Notes            Component  Ref Range & Units 05:48 1 d ago 3 d ago 4 d ago 7 d ago 8 d ago 9 d ago   WBC  4.4 - 11.3 x10*3/uL 10.9 11.8 High  11.0 10.6 12.1 High  11.6 High  13.5 High    nRBC  0.0 - 0.0 /100 WBCs 0.0 0.0 0.4 High  0.6 High  1.7 High  3.0 High  3.6 High    RBC  4.00 - 5.20 x10*6/uL 3.28 Low  3.35 Low  3.43 Low  3.23 Low  3.07 Low  2.80 Low  2.93 Low    Hemoglobin  12.0 - 16.0 g/dL 9.5 Low  9.5 Low  9.8 Low  9.2 Low  8.8 Low  8.1 Low  8.4 Low    Hematocrit  36.0 - 46.0 % 31.0 Low  31.7 Low  32.7 Low  30.7 Low  29.2 Low  26.5 Low  27.6 Low    MCV  80 - 100 fL 95 95 95 95 95 95 94   MCH  26.0 - 34.0 pg 29.0 28.4 28.6 28.5 28.7 28.9 28.7   MCHC  32.0 - 36.0 g/dL 30.6 Low  30.0 Low  30.0 Low  30.0 Low  30.1 Low  30.6 Low  30.4 Low    RDW  11.5 - 14.5 % 17.9 High  18.8 High  19.1 High  19.2 High  19.4 High  18.9 High  18.5 High    Platelets  150 - 450 x10*3/uL 217 248 276 272 308 286 358   Neutrophils %  40.0 - 80.0 % 73.0         Immature Granulocytes %, Automated  0.0 - 0.9 % 0.5         Comment: Immature Granulocyte Count (IG) includes promyelocytes, myelocytes and metamyelocytes but does not include bands. Percent differential counts (%) should be interpreted in the context of the absolute cell counts (cells/UL).   Lymphocytes %  13.0 - 44.0 % 20.1         Monocytes %  2.0 - 10.0 % 3.5         Eosinophils %  0.0 - 6.0 % 2.7         Basophils %  0.0 - 2.0 % 0.2         Neutrophils Absolute  1.60 - 5.50 x10*3/uL 7.99 High          Comment: Percent differential counts (%) should be interpreted in the context of the absolute cell counts (cells/uL).   Immature Granulocytes Absolute, Automated  0.00 - 0.50 x10*3/uL 0.05         Lymphocytes Absolute  0.80 - 3.00  x10*3/uL 2.19         Monocytes Absolute  0.05 - 0.80 x10*3/uL 0.38         Eosinophils Absolute  0.00 - 0.40 x10*3/uL 0.29         Basophils Absolute  0.00 - 0.10 x10*3/uL 0.02         Resulting Agency [facility]                 Physical Exam    General Appearance; Alert  HEENT; pupils react to light accommodation  Neck; no JVD no bruit no thyromegaly  Lungs; bibasilar crackles on inspiration  Heart; no gallop no rubs  Abdomen; active peristalsis  ; no CVA tenderness  Extremities; 2-3+ edema  Neurological; no clonus or asterixis  Musculoskeletal; decreased muscle tone             Assessment/Plan   Acute kidney injury  Anemia  Hyperuricemia  Proteinuria  Transaminitis  Autoimmune hemolytic anemia  Protein calorie malnutrition  Anemia iron deficiency and chronic illness  Plan;   Monitor Labs  Assessment & Plan           I spent  20 minutes in the professional and overall care of this patient.      Ha Balderas MD

## 2024-10-18 NOTE — PROGRESS NOTES
Subjective     No new voiced complaints      Objective       Physical Exam    General Appearance; Alert  HEENT; pupils react to light accommodation  Neck; no JVD no bruit no thyromegaly  Lungs; bibasilar crackles on inspiration  Heart; no gallop no rubs  Abdomen; active peristalsis  ; no CVA tenderness  Extremities; 2-3+ edema  Neurological; no clonus or asterixis  Musculoskeletal; decreased muscle tone             Assessment/Plan   Acute kidney injury  Anemia  Hyperuricemia  Proteinuria  Transaminitis  Autoimmune hemolytic anemia  Protein calorie malnutrition  Anemia iron deficiency and chronic illness  Plan;   Monitor Labs  Continue current therapy  Assessment & Plan           I spent  20 minutes in the professional and overall care of this patient.      Ha Balderas MD